# Patient Record
Sex: MALE | Race: WHITE | NOT HISPANIC OR LATINO | Employment: FULL TIME | URBAN - METROPOLITAN AREA
[De-identification: names, ages, dates, MRNs, and addresses within clinical notes are randomized per-mention and may not be internally consistent; named-entity substitution may affect disease eponyms.]

---

## 2017-06-30 PROBLEM — H40.009 BORDERLINE GLAUCOMA: Status: ACTIVE | Noted: 2017-01-05

## 2017-06-30 PROBLEM — E89.0 POSTOPERATIVE HYPOTHYROIDISM: Status: ACTIVE | Noted: 2017-01-05

## 2017-07-27 ENCOUNTER — OFFICE VISIT (OUTPATIENT)
Dept: GASTROENTEROLOGY | Facility: CLINIC | Age: 57
End: 2017-07-27

## 2017-07-27 VITALS
DIASTOLIC BLOOD PRESSURE: 86 MMHG | SYSTOLIC BLOOD PRESSURE: 138 MMHG | WEIGHT: 250.4 LBS | BODY MASS INDEX: 33.19 KG/M2 | HEIGHT: 73 IN

## 2017-07-27 DIAGNOSIS — D50.0 IRON DEFICIENCY ANEMIA DUE TO CHRONIC BLOOD LOSS: Primary | ICD-10-CM

## 2017-07-27 DIAGNOSIS — R19.5 OCCULT BLOOD IN STOOLS: ICD-10-CM

## 2017-07-27 PROCEDURE — 99203 OFFICE O/P NEW LOW 30 MIN: CPT | Performed by: INTERNAL MEDICINE

## 2017-07-27 NOTE — PROGRESS NOTES
Chief Complaint   Patient presents with   • Anemia   • Black or Bloody Stool     Royer Arceo is a 56 y.o. male who presents with a History of iron deficiency anemia with heme positive stools   HPI     Patient 56-year-old male with history of hypertension and hyperlipidemia presents for evaluation.  Patient noted with anemia underwent lab testing revealing iron deficiency.  Patient's stool testing was then done showing heme positivity.  Patient reports no bright red blood per rectum or melena.  Patient denies abdominal pain no nausea vomiting no change in appetite or weight loss.  Patient now here for further recommendations.  Patient reports last colonoscopy in 2011 was negative.  Patient with no family history colon rectal cancer or polyps.    Past Medical History:   Diagnosis Date   • Glaucoma    • Hyperlipidemia    • Hypothyroidism        Current Outpatient Prescriptions:   •  dorzolamide (TRUSOPT) 2 % ophthalmic solution, 1 drop., Disp: , Rfl:   •  levothyroxine (SYNTHROID, LEVOTHROID) 200 MCG tablet, Take 1 tablet by mouth daily., Disp: 90 tablet, Rfl: 3  •  meloxicam (MOBIC) 7.5 MG tablet, Take 1 tablet by mouth daily., Disp: 90 tablet, Rfl: 3  •  azithromycin (ZITHROMAX Z-FAMILIA) 250 MG tablet, Take 2 tablets the first day, then 1 tablet daily for 4 days., Disp: 6 tablet, Rfl: 0  •  Chlorphen-PSE-Atrop-Hyos-Scop (STAHIST PO), Take  by mouth., Disp: , Rfl:   Allergies   Allergen Reactions   • Penicillins    • Promethazine      Social History     Social History   • Marital status:      Spouse name: N/A   • Number of children: N/A   • Years of education: N/A     Occupational History   • Not on file.     Social History Main Topics   • Smoking status: Never Smoker   • Smokeless tobacco: Not on file   • Alcohol use Yes      Comment: very occasional    • Drug use: No   • Sexual activity: Not on file     Other Topics Concern   • Not on file     Social History Narrative     Family History   Problem Relation Age  of Onset   • Parkinsonism Father    • Diabetes Father    • Dementia Father    • Heart disease Maternal Aunt    • Stroke Paternal Grandfather    • Heart disease Maternal Aunt      Review of Systems   Constitutional: Negative.    HENT: Negative.    Eyes: Negative.    Respiratory: Negative.    Cardiovascular: Negative.    Gastrointestinal: Positive for blood in stool. Negative for abdominal distention, abdominal pain, anal bleeding, constipation, diarrhea, nausea, rectal pain and vomiting.   Endocrine: Negative.    Musculoskeletal: Negative.    Skin: Negative.    Allergic/Immunologic: Negative.    Hematological: Negative.      Vitals:    07/27/17 0959   BP: 138/86     Physical Exam   Constitutional: He is oriented to person, place, and time. He appears well-developed and well-nourished.   HENT:   Head: Normocephalic and atraumatic.   Eyes: Conjunctivae and EOM are normal. No scleral icterus.   Neck: Normal range of motion. No tracheal deviation present.   Cardiovascular: Normal rate and regular rhythm.  Exam reveals no gallop and no friction rub.    No murmur heard.  Pulmonary/Chest: Effort normal and breath sounds normal. No respiratory distress. He has no wheezes. He has no rales.   Abdominal: Soft. Bowel sounds are normal. He exhibits no distension and no mass. There is no tenderness. There is no rebound and no guarding.   Musculoskeletal: Normal range of motion. He exhibits no edema.   Lymphadenopathy:     He has no cervical adenopathy.   Neurological: He is alert and oriented to person, place, and time. No cranial nerve deficit.   Skin: Skin is warm and dry. No rash noted.   Psychiatric: He has a normal mood and affect. His behavior is normal.   Nursing note and vitals reviewed.    Diagnoses and all orders for this visit:    Iron deficiency anemia due to chronic blood loss  -     Case Request; Standing  -     Case Request    Occult blood in stools    Other orders  -     Implement Anesthesia orders day of  procedure.; Standing  -     Obtain informed consent; Standing    Patient is a 56-year-old male with history of hypothyroidism and hyperlipidemia presenting with heme-positive anemia.  Patient found iron deficient referred now for evaluation.  Patient did undergo colonoscopy in 2011 that was reportedly negative.  Labs reveal positive for iron deficiency.  In this setting would recommend patient have repeat colonoscopy but also EGD to evaluate for cause of heme positive iron deficiency anemia.

## 2017-07-31 ENCOUNTER — HOSPITAL ENCOUNTER (OUTPATIENT)
Facility: HOSPITAL | Age: 57
Setting detail: HOSPITAL OUTPATIENT SURGERY
Discharge: HOME | End: 2017-07-31
Attending: INTERNAL MEDICINE | Admitting: INTERNAL MEDICINE

## 2017-07-31 ENCOUNTER — TELEPHONE (OUTPATIENT)
Dept: GASTROENTEROLOGY | Facility: CLINIC | Age: 57
End: 2017-07-31

## 2017-07-31 ENCOUNTER — ANESTHESIA EVENT (OUTPATIENT)
Dept: GASTROENTEROLOGY | Facility: HOSPITAL | Age: 57
End: 2017-07-31

## 2017-07-31 ENCOUNTER — ANESTHESIA (OUTPATIENT)
Dept: GASTROENTEROLOGY | Facility: HOSPITAL | Age: 57
End: 2017-07-31

## 2017-07-31 VITALS
HEART RATE: 60 BPM | DIASTOLIC BLOOD PRESSURE: 84 MMHG | WEIGHT: 245 LBS | OXYGEN SATURATION: 98 % | SYSTOLIC BLOOD PRESSURE: 123 MMHG | HEIGHT: 73 IN | TEMPERATURE: 97.6 F | RESPIRATION RATE: 15 BRPM | BODY MASS INDEX: 32.47 KG/M2

## 2017-07-31 DIAGNOSIS — D50.0 IRON DEFICIENCY ANEMIA DUE TO CHRONIC BLOOD LOSS: ICD-10-CM

## 2017-07-31 PROCEDURE — 25010000002 PROPOFOL 10 MG/ML EMULSION: Performed by: NURSE ANESTHETIST, CERTIFIED REGISTERED

## 2017-07-31 PROCEDURE — 43239 EGD BIOPSY SINGLE/MULTIPLE: CPT | Performed by: INTERNAL MEDICINE

## 2017-07-31 PROCEDURE — 45378 DIAGNOSTIC COLONOSCOPY: CPT | Performed by: INTERNAL MEDICINE

## 2017-07-31 PROCEDURE — 88305 TISSUE EXAM BY PATHOLOGIST: CPT | Performed by: INTERNAL MEDICINE

## 2017-07-31 RX ORDER — SODIUM CHLORIDE 0.9 % (FLUSH) 0.9 %
1-10 SYRINGE (ML) INJECTION AS NEEDED
Status: DISCONTINUED | OUTPATIENT
Start: 2017-07-31 | End: 2017-07-31 | Stop reason: HOSPADM

## 2017-07-31 RX ORDER — SODIUM CHLORIDE, SODIUM LACTATE, POTASSIUM CHLORIDE, CALCIUM CHLORIDE 600; 310; 30; 20 MG/100ML; MG/100ML; MG/100ML; MG/100ML
30 INJECTION, SOLUTION INTRAVENOUS CONTINUOUS PRN
Status: DISCONTINUED | OUTPATIENT
Start: 2017-07-31 | End: 2017-07-31 | Stop reason: HOSPADM

## 2017-07-31 RX ORDER — PROPOFOL 10 MG/ML
VIAL (ML) INTRAVENOUS CONTINUOUS PRN
Status: DISCONTINUED | OUTPATIENT
Start: 2017-07-31 | End: 2017-07-31 | Stop reason: SURG

## 2017-07-31 RX ADMIN — PROPOFOL 140 MCG/KG/MIN: 10 INJECTION, EMULSION INTRAVENOUS at 13:45

## 2017-07-31 RX ADMIN — SODIUM CHLORIDE, POTASSIUM CHLORIDE, SODIUM LACTATE AND CALCIUM CHLORIDE: 600; 310; 30; 20 INJECTION, SOLUTION INTRAVENOUS at 13:42

## 2017-07-31 RX ADMIN — SODIUM CHLORIDE, POTASSIUM CHLORIDE, SODIUM LACTATE AND CALCIUM CHLORIDE 30 ML/HR: 600; 310; 30; 20 INJECTION, SOLUTION INTRAVENOUS at 13:12

## 2017-07-31 NOTE — ANESTHESIA PREPROCEDURE EVALUATION
Anesthesia Evaluation     Patient summary reviewed and Nursing notes reviewed   NPO Solid Status: > 8 hours  NPO Liquid Status: > 8 hours     Airway   Mallampati: III  TM distance: <3 FB  Dental - normal exam     Pulmonary - normal exam   Cardiovascular - normal exam        Neuro/Psych  GI/Hepatic/Renal/Endo    (+) obesity,  hypothyroidism,     Musculoskeletal     Abdominal  - normal exam   Substance History      OB/GYN          Other                                        Anesthesia Plan    ASA 2     MAC     Anesthetic plan and risks discussed with patient.

## 2017-07-31 NOTE — ANESTHESIA POSTPROCEDURE EVALUATION
"Patient: Royer Arceo    Procedure Summary     Date Anesthesia Start Anesthesia Stop Room / Location    07/31/17 1341 1417  [facility]       Procedure Diagnosis Surgeon Provider    COLONOSCOPY TO CECUM AND INTO TERMNAL ILEUM (N/A ); ESOPHAGOGASTRODUODENOSCOPY WITH COLD BIOPSIES (N/A Esophagus) Iron deficiency anemia due to chronic blood loss; Occult blood in stools; Diverticulosis; Internal hemorrhoids  (Iron deficiency anemia due to chronic blood loss [D50.0]) MD Eliana Powers MD          Anesthesia Type: MAC  Last vitals  BP   123/84 (07/31/17 1437)    Temp   36.4 °C (97.6 °F) (07/31/17 1425)    Pulse   60 (07/31/17 1437)   Resp   15 (07/31/17 1437)    SpO2   98 % (07/31/17 1437)      Post Anesthesia Care and Evaluation    Patient location during evaluation: PACU  Patient participation: complete - patient participated  Level of consciousness: awake  Pain score: 0  Pain management: adequate  Airway patency: patent  Anesthetic complications: No anesthetic complications    Cardiovascular status: acceptable  Respiratory status: acceptable  Hydration status: acceptable    Comments: Blood pressure 123/84, pulse 60, temperature 36.4 °C (97.6 °F), temperature source Oral, resp. rate 15, height 73\" (185.4 cm), weight 245 lb (111 kg), SpO2 98 %.        "

## 2017-08-01 LAB
LAB AP CASE REPORT: NORMAL
Lab: NORMAL
PATH REPORT.FINAL DX SPEC: NORMAL
PATH REPORT.GROSS SPEC: NORMAL

## 2017-08-09 ENCOUNTER — TELEPHONE (OUTPATIENT)
Dept: GASTROENTEROLOGY | Facility: CLINIC | Age: 57
End: 2017-08-09

## 2017-08-09 NOTE — TELEPHONE ENCOUNTER
Message was sent to Tawnya 7/31/17 to work on approval for capsule endoscopy. It looks like precert is pending review with insurance.    Called pt and advised that per Dr Rowe: the biopsy from his scope was negative for celiac disease and MD recommends to await the capsule study. Advised that we are working on getting the study approved through insurance and once that is obtained, we can schedule the test. Pt verb understanding.

## 2017-08-09 NOTE — TELEPHONE ENCOUNTER
----- Message from Lm Rowe MD sent at 8/4/2017 12:36 PM EDT -----  Patient biopsies negative for celiac.  Await small bowel capsule

## 2017-09-22 ENCOUNTER — TELEPHONE (OUTPATIENT)
Dept: GASTROENTEROLOGY | Facility: CLINIC | Age: 57
End: 2017-09-22

## 2017-09-22 NOTE — TELEPHONE ENCOUNTER
Dr Rowe,   Pt came in today for capsule study. Video will be available to read tomorrow.   Thanks

## 2017-10-23 ENCOUNTER — TELEPHONE (OUTPATIENT)
Dept: GASTROENTEROLOGY | Facility: CLINIC | Age: 57
End: 2017-10-23

## 2017-10-23 NOTE — TELEPHONE ENCOUNTER
Dr Rowe,   Pt stopped by for capsule study results. This was done one month ago. Pt has had EGD and C/S which were negative but his MDs are thinking he is still losing blood somehow. What are results and if negative, next step?  Thanks

## 2019-01-29 ENCOUNTER — OFFICE VISIT (OUTPATIENT)
Dept: ORTHOPEDIC SURGERY | Facility: CLINIC | Age: 59
End: 2019-01-29

## 2019-01-29 VITALS — TEMPERATURE: 98.8 F | HEIGHT: 73 IN | BODY MASS INDEX: 35.12 KG/M2 | WEIGHT: 265 LBS

## 2019-01-29 DIAGNOSIS — M43.10 RETROLISTHESIS: ICD-10-CM

## 2019-01-29 DIAGNOSIS — M48.062 SPINAL STENOSIS OF LUMBAR REGION WITH NEUROGENIC CLAUDICATION: ICD-10-CM

## 2019-01-29 PROCEDURE — 99205 OFFICE O/P NEW HI 60 MIN: CPT | Performed by: ORTHOPAEDIC SURGERY

## 2019-01-29 RX ORDER — LEVOTHYROXINE SODIUM 175 UG/1
175 TABLET ORAL
Refills: 0 | COMMUNITY
Start: 2019-01-25

## 2019-01-29 NOTE — PROGRESS NOTES
New patient or new problem visit    Chief Complaint   Patient presents with   • Lumbar Spine - Pain       HPI: He complains of chronic low back pain but a 6 month history of increased back pain and radiating pain into the right lower extremity in the buttock thigh and leg.  He has failed to improve with conservative care including physical therapy, chiropractic, medications.  An epidural injection helped somewhat and he has another one is planned.  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  He is seen today at request of Dr. Ordaz and he has also seen Dr. Mckenzie    PFSH: See chart- reviewed    Review of Systems   Constitutional: Negative for chills, fever and unexpected weight change.   HENT: Negative for trouble swallowing and voice change.    Eyes: Negative for visual disturbance.   Respiratory: Negative for cough and shortness of breath.    Cardiovascular: Negative for chest pain and leg swelling.   Gastrointestinal: Negative for abdominal pain, nausea and vomiting.   Endocrine: Negative for cold intolerance and heat intolerance.   Genitourinary: Negative for difficulty urinating, frequency and urgency.   Skin: Negative for rash and wound.   Allergic/Immunologic: Negative for immunocompromised state.   Neurological: Negative for weakness and numbness.   Hematological: Does not bruise/bleed easily.   Psychiatric/Behavioral: Negative for dysphoric mood. The patient is not nervous/anxious.        PE: Constitutional: Vital signs above-noted.  Awake, alert and oriented    Psychiatric: Affect and insight do not appear grossly disturbed.    Pulmonary: Breathing is unlabored, color is good.    Skin: Warm, dry and normal turgor    Cardiac:  pedal pulses intact.  No edema.    Eyesight and hearing appear adequate for examination purposes      Musculoskeletal:  There is mild tenderness to percussion and palpation of the spine.  Motion appears slightly stiff.  Posture is unremarkable to coronal and sagittal inspection.    The skin about the area is intact.  There is no palpable or visible deformity.  There is no local spasm.       Neurologic:   Reflexes are absent in the patellae and achilles.   Motor function is undisturbed in quadriceps, EHL, and gastrocnemius      Sensation appears symmetrically intact to light touch   .  In the bilateral lower extremities there is no evidence of atrophy.   Clonus is absent..  Gait appears undisturbed.  SLR test negative      MEDICAL DECISION MAKING    XRAY: Plain film x-rays of the lumbar spine show slight scoliosis and multilevel disc degeneration.  There is marketed disc degeneration at L1 2 and then moderate disc space narrowing at L5-S1 where a retrolisthesis is noted on flexion-extension x-rays this increases to about 6 mm and reduces to near normal on the flexion images.  It is about 3 mm on the myelogram images.  Myelogram demonstrates a filling defect on the right side at L3 4.  It looks good otherwise.  Post myelogram CT scan demonstrates a straight dural defect at L3 4 in continuity with facet joint at that level which crowds the roots into the remaining space and significantly narrows contrast flow.  At L5-S1 a large medial osteophyte from the facet joint at that level is causing direct impingement of the passing S1 root and there is a significant amount of foraminal stenosis as well which may be causing L5 radiculopathy.  MRI scan shows similar findings and confirms the lesion at L3 4 to the be a synovial cyst in all likelihood.  I reviewed the radiologist reports on all of these and the agree with them except for the MRI were no mention is made of the significant finding on the right the L5-S1 facet and foramen which are clearly stenotic.    Other: Dr. Mckenzie recommended pain management after initial recommendation for surgical decompression.    Impression: L3 4 right synovial cyst, L5-S1  retrolisthesis with spinal stenosis primarily right subarticular and foraminal.  Mostly radiating pain but a large component of axial pain as well.    Plan: He is miserable with pain and it dramatically impacting his activity inlays that I think can be highly reliably improved upon with surgery.  In this case I would recommend a right L3 4 laminectomy and excision of synovial cyst, and an L5-S1 laminectomy and fusion with instrumentation.  This would likely need his radicular pain and improve at least his back pain although it's unlikely to completely alleviate his axial pain.  I told him I would recommend against a more aggressive attempt to treat the axial pain with a longer fusion.  I discussed the risks and benefits of posterior spinal fusion, including where applicable, laminectomy.  Risks include adverse anesthetic events such as death, stroke and myocardial infarction.  More specific surgical risks include infection, nonunion, hardware failure, spinal fluid leakage, nerve root injury or paralysis, visceral or vascular injury, persistent pain, deep venous thrombosis, and pulmonary embolism among others. There is a 70 to 90 % chance of success.   Alternatives have been discussed.  After careful consideration the patient wishes to proceed with right L3 4, and L5-S1 laminectomy with L5-S1 fusion with instrumentation.    I thank Dr. Ordaz for this kind referral .

## 2019-02-04 PROBLEM — M43.10 RETROLISTHESIS: Status: ACTIVE | Noted: 2019-02-04

## 2019-02-04 PROBLEM — M48.062 SPINAL STENOSIS OF LUMBAR REGION WITH NEUROGENIC CLAUDICATION: Status: ACTIVE | Noted: 2019-02-04

## 2019-02-11 ENCOUNTER — APPOINTMENT (OUTPATIENT)
Dept: PREADMISSION TESTING | Facility: HOSPITAL | Age: 59
End: 2019-02-11

## 2019-02-11 VITALS
WEIGHT: 264.5 LBS | DIASTOLIC BLOOD PRESSURE: 73 MMHG | OXYGEN SATURATION: 96 % | SYSTOLIC BLOOD PRESSURE: 112 MMHG | HEIGHT: 73 IN | TEMPERATURE: 97.3 F | RESPIRATION RATE: 16 BRPM | BODY MASS INDEX: 35.06 KG/M2 | HEART RATE: 67 BPM

## 2019-02-11 LAB
ANION GAP SERPL CALCULATED.3IONS-SCNC: 12.5 MMOL/L
BUN BLD-MCNC: 19 MG/DL (ref 6–20)
BUN/CREAT SERPL: 19.2 (ref 7–25)
CALCIUM SPEC-SCNC: 9.6 MG/DL (ref 8.6–10.5)
CHLORIDE SERPL-SCNC: 102 MMOL/L (ref 98–107)
CO2 SERPL-SCNC: 24.5 MMOL/L (ref 22–29)
CREAT BLD-MCNC: 0.99 MG/DL (ref 0.76–1.27)
DEPRECATED RDW RBC AUTO: 43.5 FL (ref 37–54)
ERYTHROCYTE [DISTWIDTH] IN BLOOD BY AUTOMATED COUNT: 14 % (ref 12.3–15.4)
GFR SERPL CREATININE-BSD FRML MDRD: 78 ML/MIN/1.73
GLUCOSE BLD-MCNC: 113 MG/DL (ref 65–99)
HCT VFR BLD AUTO: 43.8 % (ref 37.5–51)
HGB BLD-MCNC: 14 G/DL (ref 13–17.7)
MCH RBC QN AUTO: 27 PG (ref 26.6–33)
MCHC RBC AUTO-ENTMCNC: 32 G/DL (ref 31.5–35.7)
MCV RBC AUTO: 84.6 FL (ref 79–97)
PLATELET # BLD AUTO: 239 10*3/MM3 (ref 140–450)
PMV BLD AUTO: 11.6 FL (ref 6–12)
POTASSIUM BLD-SCNC: 4.6 MMOL/L (ref 3.5–5.2)
RBC # BLD AUTO: 5.18 10*6/MM3 (ref 4.14–5.8)
SODIUM BLD-SCNC: 139 MMOL/L (ref 136–145)
WBC NRBC COR # BLD: 7.77 10*3/MM3 (ref 3.4–10.8)

## 2019-02-11 PROCEDURE — 80048 BASIC METABOLIC PNL TOTAL CA: CPT | Performed by: ORTHOPAEDIC SURGERY

## 2019-02-11 PROCEDURE — 85027 COMPLETE CBC AUTOMATED: CPT | Performed by: ORTHOPAEDIC SURGERY

## 2019-02-11 PROCEDURE — 93005 ELECTROCARDIOGRAM TRACING: CPT

## 2019-02-11 PROCEDURE — 36415 COLL VENOUS BLD VENIPUNCTURE: CPT

## 2019-02-11 PROCEDURE — 93010 ELECTROCARDIOGRAM REPORT: CPT | Performed by: INTERNAL MEDICINE

## 2019-02-11 NOTE — DISCHARGE INSTRUCTIONS
PLEASE ARRIVE AT 5:30 AM ON 2/18/2019      Take the following medications the morning of surgery with a small sip of water:        General Instructions:  • Do not eat solid food after midnight the night before surgery.  • You may drink clear liquids day of surgery but must stop at least one hour before your hospital arrival time. **STOP FLUIDS AT 4:30 AM DAY OF SURGERY**  • It is beneficial for you to have a clear drink that contains carbohydrates the day of surgery.  We suggest a 12 to 20 ounce bottle of Gatorade or Powerade for non-diabetic patients or a 12 to 20 ounce bottle of G2 or Powerade Zero for diabetic patients. (Pediatric patients, are not advised to drink a 12 to 20 ounce carbohydrate drink)    Clear liquids are liquids you can see through.  Nothing red in color.     Plain water                               Sports drinks  Sodas                                   Gelatin (Jell-O)  Fruit juices without pulp such as white grape juice and apple juice  Popsicles that contain no fruit or yogurt  Tea or coffee (no cream or milk added)  Gatorade / Powerade  G2 / Powerade Zero    • Infants may have breast milk up to four hours before surgery.  • Infants drinking formula may drink formula up to six hours before surgery.   • Patients who avoid smoking, chewing tobacco and alcohol for 4 weeks prior to surgery have a reduced risk of post-operative complications.  Quit smoking as many days before surgery as you can.  • Do not smoke, use chewing tobacco or drink alcohol the day of surgery.   • If applicable bring your C-PAP/ BI-PAP machine.  • Bring any papers given to you in the doctor’s office.  • Wear clean comfortable clothes and socks.  • Do not wear contact lenses or make-up.  Bring a case for your glasses.   • Bring crutches or walker if applicable.  • Remove all piercings.  Leave jewelry and any other valuables at home.  • Hair extensions with metal clips must be removed prior to surgery.  • The Pre-Admission  Testing nurse will instruct you to bring medications if unable to obtain an accurate list in Pre-Admission Testing.            Preventing a Surgical Site Infection:  • For 2 to 3 days before surgery, avoid shaving with a razor because the razor can irritate skin and make it easier to develop an infection.    • Any areas of open skin can increase the risk of a post-operative wound infection by allowing bacteria to enter and travel throughout the body.  Notify your surgeon if you have any skin wounds / rashes even if it is not near the expected surgical site.  The area will need assessed to determine if surgery should be delayed until it is healed.  • The night prior to surgery sleep in a clean bed with clean clothing.  Do not allow pets to sleep with you.  • Shower on the morning of surgery using a fresh bar of anti-bacterial soap (such as Dial) and clean washcloth.  Dry with a clean towel and dress in clean clothing.  • Ask your surgeon if you will be receiving antibiotics prior to surgery.  • Make sure you, your family, and all healthcare providers clean their hands with soap and water or an alcohol based hand  before caring for you or your wound.    Day of surgery:  Upon arrival, a Pre-op nurse and Anesthesiologist will review your health history, obtain vital signs, and answer questions you may have.  The only belongings needed at this time will be your home medications and if applicable your C-PAP/BI-PAP machine.  If you are staying overnight your family can leave the rest of your belongings in the car and bring them to your room later.  A Pre-op nurse will start an IV and you may receive medication in preparation for surgery, including something to help you relax.  Your family will be able to see you in the Pre-op area.  While you are in surgery your family should notify the waiting room  if they leave the waiting room area and provide a contact phone number.    Please be aware that surgery  does come with discomfort.  We want to make every effort to control your discomfort so please discuss any uncontrolled symptoms with your nurse.   Your doctor will most likely have prescribed pain medications.      If you are going home after surgery you will receive individualized written care instructions before being discharged.  A responsible adult must drive you to and from the hospital on the day of your surgery and stay with you for 24 hours.    If you are staying overnight following surgery, you will be transported to your hospital room following the recovery period.  [facility] has all private rooms.    You have received a list of surgical assistants for your reference.  If you have any questions please call Pre-Admission Testing at [phone].  Deductibles and co-payments are collected on the day of service. Please be prepared to pay the required co-pay, deductible or deposit on the day of service as defined by your plan.

## 2019-02-18 ENCOUNTER — ANESTHESIA EVENT (OUTPATIENT)
Dept: PERIOP | Facility: HOSPITAL | Age: 59
End: 2019-02-18

## 2019-02-18 ENCOUNTER — ANESTHESIA (OUTPATIENT)
Dept: PERIOP | Facility: HOSPITAL | Age: 59
End: 2019-02-18

## 2019-02-18 ENCOUNTER — HOSPITAL ENCOUNTER (INPATIENT)
Facility: HOSPITAL | Age: 59
LOS: 3 days | Discharge: HOME | End: 2019-02-21
Attending: ORTHOPAEDIC SURGERY | Admitting: ORTHOPAEDIC SURGERY

## 2019-02-18 ENCOUNTER — APPOINTMENT (OUTPATIENT)
Dept: GENERAL RADIOLOGY | Facility: HOSPITAL | Age: 59
End: 2019-02-18

## 2019-02-18 DIAGNOSIS — M48.062 SPINAL STENOSIS OF LUMBAR REGION WITH NEUROGENIC CLAUDICATION: ICD-10-CM

## 2019-02-18 DIAGNOSIS — Z74.09 IMPAIRED MOBILITY: Primary | ICD-10-CM

## 2019-02-18 DIAGNOSIS — M43.10 RETROLISTHESIS: ICD-10-CM

## 2019-02-18 LAB
ABO GROUP BLD: NORMAL
BLD GP AB SCN SERPL QL: NEGATIVE
HCT VFR BLD AUTO: 38.9 % (ref 37.5–51)
HGB BLD-MCNC: 12.8 G/DL (ref 13–17.7)
RH BLD: POSITIVE
T&S EXPIRATION DATE: NORMAL

## 2019-02-18 PROCEDURE — 25010000002 PROPOFOL 10 MG/ML EMULSION: Performed by: NURSE ANESTHETIST, CERTIFIED REGISTERED

## 2019-02-18 PROCEDURE — 25010000002 MIDAZOLAM PER 1 MG: Performed by: ANESTHESIOLOGY

## 2019-02-18 PROCEDURE — 00UT0JZ SUPPLEMENT SPINAL MENINGES WITH SYNTHETIC SUBSTITUTE, OPEN APPROACH: ICD-10-PCS | Performed by: ORTHOPAEDIC SURGERY

## 2019-02-18 PROCEDURE — 22840 INSERT SPINE FIXATION DEVICE: CPT | Performed by: ORTHOPAEDIC SURGERY

## 2019-02-18 PROCEDURE — C1713 ANCHOR/SCREW BN/BN,TIS/BN: HCPCS | Performed by: ORTHOPAEDIC SURGERY

## 2019-02-18 PROCEDURE — 25010000002 HYDROMORPHONE PER 4 MG: Performed by: NURSE ANESTHETIST, CERTIFIED REGISTERED

## 2019-02-18 PROCEDURE — 85014 HEMATOCRIT: CPT | Performed by: ORTHOPAEDIC SURGERY

## 2019-02-18 PROCEDURE — 86901 BLOOD TYPING SEROLOGIC RH(D): CPT | Performed by: ORTHOPAEDIC SURGERY

## 2019-02-18 PROCEDURE — 85018 HEMOGLOBIN: CPT | Performed by: ORTHOPAEDIC SURGERY

## 2019-02-18 PROCEDURE — 25010000002 VANCOMYCIN 1 G RECONSTITUTED SOLUTION 1 EACH VIAL: Performed by: ORTHOPAEDIC SURGERY

## 2019-02-18 PROCEDURE — 4A11X4G MONITORING OF PERIPHERAL NERVOUS ELECTRICAL ACTIVITY, INTRAOPERATIVE, EXTERNAL APPROACH: ICD-10-PCS | Performed by: ORTHOPAEDIC SURGERY

## 2019-02-18 PROCEDURE — 25010000002 DEXAMETHASONE PER 1 MG: Performed by: NURSE ANESTHETIST, CERTIFIED REGISTERED

## 2019-02-18 PROCEDURE — 63267 EXCISE INTRSPINL LESION LMBR: CPT | Performed by: ORTHOPAEDIC SURGERY

## 2019-02-18 PROCEDURE — 72100 X-RAY EXAM L-S SPINE 2/3 VWS: CPT

## 2019-02-18 PROCEDURE — 25010000002 ONDANSETRON PER 1 MG: Performed by: NURSE ANESTHETIST, CERTIFIED REGISTERED

## 2019-02-18 PROCEDURE — 76000 FLUOROSCOPY <1 HR PHYS/QHP: CPT

## 2019-02-18 PROCEDURE — 20936 SP BONE AGRFT LOCAL ADD-ON: CPT | Performed by: ORTHOPAEDIC SURGERY

## 2019-02-18 PROCEDURE — 88304 TISSUE EXAM BY PATHOLOGIST: CPT | Performed by: ORTHOPAEDIC SURGERY

## 2019-02-18 PROCEDURE — 0SG3071 FUSION OF LUMBOSACRAL JOINT WITH AUTOLOGOUS TISSUE SUBSTITUTE, POSTERIOR APPROACH, POSTERIOR COLUMN, OPEN APPROACH: ICD-10-PCS | Performed by: ORTHOPAEDIC SURGERY

## 2019-02-18 PROCEDURE — 25010000002 HEPARIN (PORCINE) PER 1000 UNITS: Performed by: ORTHOPAEDIC SURGERY

## 2019-02-18 PROCEDURE — 86850 RBC ANTIBODY SCREEN: CPT | Performed by: ORTHOPAEDIC SURGERY

## 2019-02-18 PROCEDURE — 25010000002 PHENYLEPHRINE PER 1 ML: Performed by: NURSE ANESTHETIST, CERTIFIED REGISTERED

## 2019-02-18 PROCEDURE — 86900 BLOOD TYPING SEROLOGIC ABO: CPT | Performed by: ORTHOPAEDIC SURGERY

## 2019-02-18 PROCEDURE — 63047 LAM FACETEC & FORAMOT LUMBAR: CPT | Performed by: ORTHOPAEDIC SURGERY

## 2019-02-18 PROCEDURE — 25010000002 FENTANYL CITRATE (PF) 100 MCG/2ML SOLUTION: Performed by: NURSE ANESTHETIST, CERTIFIED REGISTERED

## 2019-02-18 PROCEDURE — 01NB0ZZ RELEASE LUMBAR NERVE, OPEN APPROACH: ICD-10-PCS | Performed by: ORTHOPAEDIC SURGERY

## 2019-02-18 PROCEDURE — 25010000002 VANCOMYCIN 10 G RECONSTITUTED SOLUTION: Performed by: ORTHOPAEDIC SURGERY

## 2019-02-18 PROCEDURE — 22612 ARTHRD PST TQ 1NTRSPC LUMBAR: CPT | Performed by: ORTHOPAEDIC SURGERY

## 2019-02-18 DEVICE — SCRW EXPEDIUM PA TI 6X40MM: Type: IMPLANTABLE DEVICE | Site: SPINE LUMBAR | Status: FUNCTIONAL

## 2019-02-18 DEVICE — SCRW EXPEDIUM PA TI 6X45MM: Type: IMPLANTABLE DEVICE | Site: SPINE LUMBAR | Status: FUNCTIONAL

## 2019-02-18 DEVICE — SCRW INNR EXPEDIUM: Type: IMPLANTABLE DEVICE | Site: SPINE LUMBAR | Status: FUNCTIONAL

## 2019-02-18 DEVICE — ROD PREBNT SPINE EXPEDIUM TI 5.5X40MM: Type: IMPLANTABLE DEVICE | Site: SPINE LUMBAR | Status: FUNCTIONAL

## 2019-02-18 DEVICE — SEALANT FIBRIN TISSEEL FZ 4ML: Type: IMPLANTABLE DEVICE | Site: SPINE LUMBAR | Status: FUNCTIONAL

## 2019-02-18 RX ORDER — SODIUM CHLORIDE 450 MG/100ML
100 INJECTION, SOLUTION INTRAVENOUS CONTINUOUS
Status: DISCONTINUED | OUTPATIENT
Start: 2019-02-18 | End: 2019-02-20

## 2019-02-18 RX ORDER — OXYCODONE AND ACETAMINOPHEN 7.5; 325 MG/1; MG/1
1 TABLET ORAL ONCE AS NEEDED
Status: DISCONTINUED | OUTPATIENT
Start: 2019-02-18 | End: 2019-02-18 | Stop reason: HOSPADM

## 2019-02-18 RX ORDER — PROPOFOL 10 MG/ML
VIAL (ML) INTRAVENOUS AS NEEDED
Status: DISCONTINUED | OUTPATIENT
Start: 2019-02-18 | End: 2019-02-18 | Stop reason: SURG

## 2019-02-18 RX ORDER — EPHEDRINE SULFATE 50 MG/ML
5 INJECTION, SOLUTION INTRAVENOUS ONCE AS NEEDED
Status: DISCONTINUED | OUTPATIENT
Start: 2019-02-18 | End: 2019-02-18 | Stop reason: HOSPADM

## 2019-02-18 RX ORDER — ONDANSETRON 4 MG/1
4 TABLET, ORALLY DISINTEGRATING ORAL EVERY 6 HOURS PRN
Status: DISCONTINUED | OUTPATIENT
Start: 2019-02-18 | End: 2019-02-21 | Stop reason: HOSPADM

## 2019-02-18 RX ORDER — HYDROMORPHONE HYDROCHLORIDE 1 MG/ML
0.5 INJECTION, SOLUTION INTRAMUSCULAR; INTRAVENOUS; SUBCUTANEOUS
Status: DISCONTINUED | OUTPATIENT
Start: 2019-02-18 | End: 2019-02-18 | Stop reason: HOSPADM

## 2019-02-18 RX ORDER — ONDANSETRON 4 MG/1
4 TABLET, FILM COATED ORAL EVERY 6 HOURS PRN
Status: DISCONTINUED | OUTPATIENT
Start: 2019-02-18 | End: 2019-02-21 | Stop reason: HOSPADM

## 2019-02-18 RX ORDER — FENTANYL CITRATE 50 UG/ML
INJECTION, SOLUTION INTRAMUSCULAR; INTRAVENOUS AS NEEDED
Status: DISCONTINUED | OUTPATIENT
Start: 2019-02-18 | End: 2019-02-18 | Stop reason: SURG

## 2019-02-18 RX ORDER — MIDAZOLAM HYDROCHLORIDE 1 MG/ML
2 INJECTION INTRAMUSCULAR; INTRAVENOUS
Status: DISCONTINUED | OUTPATIENT
Start: 2019-02-18 | End: 2019-02-18 | Stop reason: HOSPADM

## 2019-02-18 RX ORDER — FENTANYL CITRATE 50 UG/ML
50 INJECTION, SOLUTION INTRAMUSCULAR; INTRAVENOUS
Status: DISCONTINUED | OUTPATIENT
Start: 2019-02-18 | End: 2019-02-18 | Stop reason: HOSPADM

## 2019-02-18 RX ORDER — METAXALONE 800 MG/1
800 TABLET ORAL 3 TIMES DAILY
Status: DISCONTINUED | OUTPATIENT
Start: 2019-02-18 | End: 2019-02-21 | Stop reason: HOSPADM

## 2019-02-18 RX ORDER — SODIUM CHLORIDE 0.9 % (FLUSH) 0.9 %
1-10 SYRINGE (ML) INJECTION AS NEEDED
Status: DISCONTINUED | OUTPATIENT
Start: 2019-02-18 | End: 2019-02-18 | Stop reason: HOSPADM

## 2019-02-18 RX ORDER — HYDROMORPHONE HCL 110MG/55ML
PATIENT CONTROLLED ANALGESIA SYRINGE INTRAVENOUS AS NEEDED
Status: DISCONTINUED | OUTPATIENT
Start: 2019-02-18 | End: 2019-02-18 | Stop reason: SURG

## 2019-02-18 RX ORDER — NALOXONE HCL 0.4 MG/ML
0.1 VIAL (ML) INJECTION
Status: DISCONTINUED | OUTPATIENT
Start: 2019-02-18 | End: 2019-02-21 | Stop reason: HOSPADM

## 2019-02-18 RX ORDER — BISACODYL 10 MG
10 SUPPOSITORY, RECTAL RECTAL DAILY PRN
Status: DISCONTINUED | OUTPATIENT
Start: 2019-02-18 | End: 2019-02-21 | Stop reason: HOSPADM

## 2019-02-18 RX ORDER — DEXAMETHASONE SODIUM PHOSPHATE 10 MG/ML
INJECTION INTRAMUSCULAR; INTRAVENOUS AS NEEDED
Status: DISCONTINUED | OUTPATIENT
Start: 2019-02-18 | End: 2019-02-18 | Stop reason: SURG

## 2019-02-18 RX ORDER — HYDROMORPHONE HCL IN 0.9% NACL 10 MG/50ML
PATIENT CONTROLLED ANALGESIA SYRINGE INTRAVENOUS CONTINUOUS
Status: DISCONTINUED | OUTPATIENT
Start: 2019-02-18 | End: 2019-02-20

## 2019-02-18 RX ORDER — TEMAZEPAM 15 MG/1
15 CAPSULE ORAL NIGHTLY PRN
Status: DISCONTINUED | OUTPATIENT
Start: 2019-02-18 | End: 2019-02-21 | Stop reason: HOSPADM

## 2019-02-18 RX ORDER — EPHEDRINE SULFATE 50 MG/ML
INJECTION, SOLUTION INTRAVENOUS AS NEEDED
Status: DISCONTINUED | OUTPATIENT
Start: 2019-02-18 | End: 2019-02-18 | Stop reason: SURG

## 2019-02-18 RX ORDER — LABETALOL HYDROCHLORIDE 5 MG/ML
5 INJECTION, SOLUTION INTRAVENOUS
Status: DISCONTINUED | OUTPATIENT
Start: 2019-02-18 | End: 2019-02-18 | Stop reason: HOSPADM

## 2019-02-18 RX ORDER — HYDRALAZINE HYDROCHLORIDE 20 MG/ML
5 INJECTION INTRAMUSCULAR; INTRAVENOUS
Status: DISCONTINUED | OUTPATIENT
Start: 2019-02-18 | End: 2019-02-18 | Stop reason: HOSPADM

## 2019-02-18 RX ORDER — MIDAZOLAM HYDROCHLORIDE 1 MG/ML
1 INJECTION INTRAMUSCULAR; INTRAVENOUS
Status: DISCONTINUED | OUTPATIENT
Start: 2019-02-18 | End: 2019-02-18 | Stop reason: HOSPADM

## 2019-02-18 RX ORDER — ACETAMINOPHEN 325 MG/1
650 TABLET ORAL ONCE AS NEEDED
Status: DISCONTINUED | OUTPATIENT
Start: 2019-02-18 | End: 2019-02-18 | Stop reason: HOSPADM

## 2019-02-18 RX ORDER — SODIUM CHLORIDE 0.9 % (FLUSH) 0.9 %
1-10 SYRINGE (ML) INJECTION AS NEEDED
Status: DISCONTINUED | OUTPATIENT
Start: 2019-02-18 | End: 2019-02-21 | Stop reason: HOSPADM

## 2019-02-18 RX ORDER — OXYCODONE HYDROCHLORIDE AND ACETAMINOPHEN 5; 325 MG/1; MG/1
2 TABLET ORAL EVERY 4 HOURS PRN
Status: DISCONTINUED | OUTPATIENT
Start: 2019-02-18 | End: 2019-02-20

## 2019-02-18 RX ORDER — GLYCOPYRROLATE 0.2 MG/ML
INJECTION INTRAMUSCULAR; INTRAVENOUS AS NEEDED
Status: DISCONTINUED | OUTPATIENT
Start: 2019-02-18 | End: 2019-02-18 | Stop reason: SURG

## 2019-02-18 RX ORDER — LEVOTHYROXINE SODIUM 175 UG/1
175 TABLET ORAL
Status: DISCONTINUED | OUTPATIENT
Start: 2019-02-19 | End: 2019-02-21 | Stop reason: HOSPADM

## 2019-02-18 RX ORDER — ONDANSETRON 2 MG/ML
4 INJECTION INTRAMUSCULAR; INTRAVENOUS EVERY 6 HOURS PRN
Status: DISCONTINUED | OUTPATIENT
Start: 2019-02-18 | End: 2019-02-21 | Stop reason: HOSPADM

## 2019-02-18 RX ORDER — HYDROCODONE BITARTRATE AND ACETAMINOPHEN 7.5; 325 MG/1; MG/1
1 TABLET ORAL ONCE AS NEEDED
Status: DISCONTINUED | OUTPATIENT
Start: 2019-02-18 | End: 2019-02-18 | Stop reason: HOSPADM

## 2019-02-18 RX ORDER — DORZOLAMIDE HCL 20 MG/ML
1 SOLUTION/ DROPS OPHTHALMIC NIGHTLY
Status: DISCONTINUED | OUTPATIENT
Start: 2019-02-18 | End: 2019-02-21 | Stop reason: HOSPADM

## 2019-02-18 RX ORDER — MAGNESIUM HYDROXIDE 1200 MG/15ML
LIQUID ORAL AS NEEDED
Status: DISCONTINUED | OUTPATIENT
Start: 2019-02-18 | End: 2019-02-18 | Stop reason: HOSPADM

## 2019-02-18 RX ORDER — DIPHENHYDRAMINE HCL 25 MG
25 CAPSULE ORAL
Status: DISCONTINUED | OUTPATIENT
Start: 2019-02-18 | End: 2019-02-18 | Stop reason: HOSPADM

## 2019-02-18 RX ORDER — LISINOPRIL 20 MG/1
20 TABLET ORAL NIGHTLY
Status: DISCONTINUED | OUTPATIENT
Start: 2019-02-18 | End: 2019-02-21 | Stop reason: HOSPADM

## 2019-02-18 RX ORDER — FLUMAZENIL 0.1 MG/ML
0.2 INJECTION INTRAVENOUS AS NEEDED
Status: DISCONTINUED | OUTPATIENT
Start: 2019-02-18 | End: 2019-02-18 | Stop reason: HOSPADM

## 2019-02-18 RX ORDER — SCOLOPAMINE TRANSDERMAL SYSTEM 1 MG/1
1 PATCH, EXTENDED RELEASE TRANSDERMAL ONCE
Status: DISCONTINUED | OUTPATIENT
Start: 2019-02-18 | End: 2019-02-18

## 2019-02-18 RX ORDER — ACETAMINOPHEN 500 MG
1000 TABLET ORAL ONCE
Status: COMPLETED | OUTPATIENT
Start: 2019-02-18 | End: 2019-02-18

## 2019-02-18 RX ORDER — DIPHENHYDRAMINE HYDROCHLORIDE 50 MG/ML
12.5 INJECTION INTRAMUSCULAR; INTRAVENOUS
Status: DISCONTINUED | OUTPATIENT
Start: 2019-02-18 | End: 2019-02-18 | Stop reason: HOSPADM

## 2019-02-18 RX ORDER — ONDANSETRON 2 MG/ML
INJECTION INTRAMUSCULAR; INTRAVENOUS AS NEEDED
Status: DISCONTINUED | OUTPATIENT
Start: 2019-02-18 | End: 2019-02-18 | Stop reason: SURG

## 2019-02-18 RX ORDER — LIDOCAINE HYDROCHLORIDE 10 MG/ML
0.5 INJECTION, SOLUTION EPIDURAL; INFILTRATION; INTRACAUDAL; PERINEURAL ONCE AS NEEDED
Status: DISCONTINUED | OUTPATIENT
Start: 2019-02-18 | End: 2019-02-18 | Stop reason: HOSPADM

## 2019-02-18 RX ORDER — NALOXONE HCL 0.4 MG/ML
0.2 VIAL (ML) INJECTION AS NEEDED
Status: DISCONTINUED | OUTPATIENT
Start: 2019-02-18 | End: 2019-02-18 | Stop reason: HOSPADM

## 2019-02-18 RX ORDER — SENNA AND DOCUSATE SODIUM 50; 8.6 MG/1; MG/1
1 TABLET, FILM COATED ORAL 2 TIMES DAILY
Status: DISCONTINUED | OUTPATIENT
Start: 2019-02-18 | End: 2019-02-21 | Stop reason: HOSPADM

## 2019-02-18 RX ORDER — LIDOCAINE HYDROCHLORIDE 20 MG/ML
INJECTION, SOLUTION INFILTRATION; PERINEURAL AS NEEDED
Status: DISCONTINUED | OUTPATIENT
Start: 2019-02-18 | End: 2019-02-18 | Stop reason: SURG

## 2019-02-18 RX ORDER — ONDANSETRON 2 MG/ML
4 INJECTION INTRAMUSCULAR; INTRAVENOUS ONCE AS NEEDED
Status: DISCONTINUED | OUTPATIENT
Start: 2019-02-18 | End: 2019-02-18 | Stop reason: HOSPADM

## 2019-02-18 RX ORDER — FAMOTIDINE 10 MG/ML
20 INJECTION, SOLUTION INTRAVENOUS ONCE
Status: COMPLETED | OUTPATIENT
Start: 2019-02-18 | End: 2019-02-18

## 2019-02-18 RX ORDER — SODIUM CHLORIDE, SODIUM LACTATE, POTASSIUM CHLORIDE, CALCIUM CHLORIDE 600; 310; 30; 20 MG/100ML; MG/100ML; MG/100ML; MG/100ML
9 INJECTION, SOLUTION INTRAVENOUS CONTINUOUS
Status: DISCONTINUED | OUTPATIENT
Start: 2019-02-18 | End: 2019-02-20

## 2019-02-18 RX ORDER — SODIUM CHLORIDE 0.9 % (FLUSH) 0.9 %
3 SYRINGE (ML) INJECTION EVERY 12 HOURS SCHEDULED
Status: DISCONTINUED | OUTPATIENT
Start: 2019-02-18 | End: 2019-02-21 | Stop reason: HOSPADM

## 2019-02-18 RX ORDER — ROCURONIUM BROMIDE 10 MG/ML
INJECTION, SOLUTION INTRAVENOUS AS NEEDED
Status: DISCONTINUED | OUTPATIENT
Start: 2019-02-18 | End: 2019-02-18 | Stop reason: SURG

## 2019-02-18 RX ORDER — METHADONE HYDROCHLORIDE 10 MG/1
10 TABLET ORAL ONCE
Status: COMPLETED | OUTPATIENT
Start: 2019-02-18 | End: 2019-02-18

## 2019-02-18 RX ORDER — GABAPENTIN 300 MG/1
600 CAPSULE ORAL ONCE
Status: COMPLETED | OUTPATIENT
Start: 2019-02-18 | End: 2019-02-18

## 2019-02-18 RX ORDER — MEPERIDINE HYDROCHLORIDE 25 MG/ML
12.5 INJECTION INTRAMUSCULAR; INTRAVENOUS; SUBCUTANEOUS
Status: DISCONTINUED | OUTPATIENT
Start: 2019-02-18 | End: 2019-02-18 | Stop reason: HOSPADM

## 2019-02-18 RX ADMIN — PHENYLEPHRINE HYDROCHLORIDE 100 MCG: 10 INJECTION INTRAVENOUS at 09:11

## 2019-02-18 RX ADMIN — SODIUM CHLORIDE, POTASSIUM CHLORIDE, SODIUM LACTATE AND CALCIUM CHLORIDE: 600; 310; 30; 20 INJECTION, SOLUTION INTRAVENOUS at 07:37

## 2019-02-18 RX ADMIN — SODIUM CHLORIDE, POTASSIUM CHLORIDE, SODIUM LACTATE AND CALCIUM CHLORIDE 9 ML/HR: 600; 310; 30; 20 INJECTION, SOLUTION INTRAVENOUS at 06:56

## 2019-02-18 RX ADMIN — PROPOFOL 200 MG: 10 INJECTION, EMULSION INTRAVENOUS at 07:42

## 2019-02-18 RX ADMIN — DEXAMETHASONE SODIUM PHOSPHATE 8 MG: 10 INJECTION INTRAMUSCULAR; INTRAVENOUS at 08:08

## 2019-02-18 RX ADMIN — FAMOTIDINE 20 MG: 10 INJECTION, SOLUTION INTRAVENOUS at 06:59

## 2019-02-18 RX ADMIN — PHENYLEPHRINE HYDROCHLORIDE 100 MCG: 10 INJECTION INTRAVENOUS at 08:43

## 2019-02-18 RX ADMIN — FENTANYL CITRATE 50 MCG: 50 INJECTION, SOLUTION INTRAMUSCULAR; INTRAVENOUS at 10:47

## 2019-02-18 RX ADMIN — DORZOLAMIDE HYDROCHLORIDE 1 DROP: 20 SOLUTION/ DROPS OPHTHALMIC at 21:10

## 2019-02-18 RX ADMIN — LISINOPRIL 20 MG: 20 TABLET ORAL at 21:10

## 2019-02-18 RX ADMIN — FENTANYL CITRATE 100 MCG: 50 INJECTION, SOLUTION INTRAMUSCULAR; INTRAVENOUS at 08:16

## 2019-02-18 RX ADMIN — LIDOCAINE HYDROCHLORIDE 100 MG: 20 INJECTION, SOLUTION INFILTRATION; PERINEURAL at 07:42

## 2019-02-18 RX ADMIN — HYDROMORPHONE HYDROCHLORIDE 1 MG: 2 INJECTION INTRAMUSCULAR; INTRAVENOUS; SUBCUTANEOUS at 10:23

## 2019-02-18 RX ADMIN — METAXALONE 800 MG: 800 TABLET ORAL at 17:14

## 2019-02-18 RX ADMIN — FENTANYL CITRATE 50 MCG: 50 INJECTION, SOLUTION INTRAMUSCULAR; INTRAVENOUS at 11:25

## 2019-02-18 RX ADMIN — ROCURONIUM BROMIDE 30 MG: 10 INJECTION INTRAVENOUS at 08:41

## 2019-02-18 RX ADMIN — VANCOMYCIN HYDROCHLORIDE 1750 MG: 10 INJECTION, POWDER, LYOPHILIZED, FOR SOLUTION INTRAVENOUS at 17:15

## 2019-02-18 RX ADMIN — PROPOFOL 100 MG: 10 INJECTION, EMULSION INTRAVENOUS at 10:23

## 2019-02-18 RX ADMIN — EPHEDRINE SULFATE 10 MG: 50 INJECTION INTRAMUSCULAR; INTRAVENOUS; SUBCUTANEOUS at 08:41

## 2019-02-18 RX ADMIN — GLYCOPYRROLATE 0.3 MG: 0.2 INJECTION INTRAMUSCULAR; INTRAVENOUS at 08:26

## 2019-02-18 RX ADMIN — HYDROMORPHONE HYDROCHLORIDE 0.5 MG: 1 INJECTION, SOLUTION INTRAMUSCULAR; INTRAVENOUS; SUBCUTANEOUS at 11:25

## 2019-02-18 RX ADMIN — SODIUM CHLORIDE, POTASSIUM CHLORIDE, SODIUM LACTATE AND CALCIUM CHLORIDE: 600; 310; 30; 20 INJECTION, SOLUTION INTRAVENOUS at 08:17

## 2019-02-18 RX ADMIN — MIDAZOLAM 2 MG: 1 INJECTION INTRAMUSCULAR; INTRAVENOUS at 06:59

## 2019-02-18 RX ADMIN — SENNOSIDES AND DOCUSATE SODIUM 1 TABLET: 8.6; 5 TABLET ORAL at 21:10

## 2019-02-18 RX ADMIN — OXYCODONE AND ACETAMINOPHEN 2 TABLET: 5; 325 TABLET ORAL at 21:10

## 2019-02-18 RX ADMIN — ONDANSETRON 4 MG: 2 INJECTION INTRAMUSCULAR; INTRAVENOUS at 10:18

## 2019-02-18 RX ADMIN — HYDROMORPHONE HYDROCHLORIDE: 10 INJECTION INTRAMUSCULAR; INTRAVENOUS; SUBCUTANEOUS at 11:13

## 2019-02-18 RX ADMIN — ROCURONIUM BROMIDE 20 MG: 10 INJECTION INTRAVENOUS at 08:16

## 2019-02-18 RX ADMIN — FENTANYL CITRATE 50 MCG: 50 INJECTION, SOLUTION INTRAMUSCULAR; INTRAVENOUS at 13:37

## 2019-02-18 RX ADMIN — GABAPENTIN 600 MG: 300 CAPSULE ORAL at 06:56

## 2019-02-18 RX ADMIN — ROCURONIUM BROMIDE 40 MG: 10 INJECTION INTRAVENOUS at 07:42

## 2019-02-18 RX ADMIN — METHADONE HYDROCHLORIDE 10 MG: 10 TABLET ORAL at 06:57

## 2019-02-18 RX ADMIN — ACETAMINOPHEN 1000 MG: 500 TABLET, FILM COATED ORAL at 06:57

## 2019-02-18 RX ADMIN — PHENYLEPHRINE HYDROCHLORIDE 200 MCG: 10 INJECTION INTRAVENOUS at 09:31

## 2019-02-18 RX ADMIN — ROCURONIUM BROMIDE 20 MG: 10 INJECTION INTRAVENOUS at 10:01

## 2019-02-18 RX ADMIN — SODIUM CHLORIDE 100 ML/HR: 4.5 INJECTION, SOLUTION INTRAVENOUS at 17:14

## 2019-02-18 RX ADMIN — SUGAMMADEX 400 MG: 100 INJECTION, SOLUTION INTRAVENOUS at 10:20

## 2019-02-18 RX ADMIN — EPHEDRINE SULFATE 10 MG: 50 INJECTION INTRAMUSCULAR; INTRAVENOUS; SUBCUTANEOUS at 09:34

## 2019-02-18 RX ADMIN — FENTANYL CITRATE 100 MCG: 50 INJECTION, SOLUTION INTRAMUSCULAR; INTRAVENOUS at 07:38

## 2019-02-18 RX ADMIN — POLYETHYLENE GLYCOL 3350 17 G: 17 POWDER, FOR SOLUTION ORAL at 17:14

## 2019-02-18 RX ADMIN — SCOPALAMINE 1 PATCH: 1 PATCH, EXTENDED RELEASE TRANSDERMAL at 06:57

## 2019-02-18 RX ADMIN — VANCOMYCIN HYDROCHLORIDE 1750 MG: 10 INJECTION, POWDER, LYOPHILIZED, FOR SOLUTION INTRAVENOUS at 06:17

## 2019-02-18 NOTE — ANESTHESIA PREPROCEDURE EVALUATION
Anesthesia Evaluation     Patient summary reviewed   history of anesthetic complications: PONV  NPO Solid Status: > 8 hours  NPO Liquid Status: > 2 hours           Airway   Mallampati: II  TM distance: >3 FB  Neck ROM: full  Possible difficult intubation  Dental - normal exam     Pulmonary     breath sounds clear to auscultation  (+) asthma,   (-) COPD, sleep apnea, rhonchi, decreased breath sounds, wheezes, not a smoker  Cardiovascular   Exercise tolerance: good (4-7 METS)    Rhythm: regular  Rate: normal    (+) hypertension, hyperlipidemia,   (-) valvular problems/murmurs, past MI, dysrhythmias, angina, murmur, CABG      Neuro/Psych  (-) seizures, CVA  GI/Hepatic/Renal/Endo    (+) obesity,   hypothyroidism,   (-) liver disease, no renal disease, diabetes, hyperthyroidism    Musculoskeletal     (+) neck pain,       ROS comment: Bone spurs in neck  Abdominal     Abdomen: soft.   Substance History      OB/GYN          Other   (+) arthritis                   Anesthesia Plan    ASA 3     general   (Scopalamine patch for PONV prevention   )  intravenous induction   Anesthetic plan, all risks, benefits, and alternatives have been provided, discussed and informed consent has been obtained with: patient and spouse/significant other.

## 2019-02-18 NOTE — INTERVAL H&P NOTE
H&P updated. The patient was examined and He has a bit of ankle pain today on the right where there is minimal tenderness.  He states he has a history of arthritis but this actually could be radicular.  Additionally the insurance failed to approve payment for bone marrow aspiration and bone graft substitute placement calling it experimental.  This is untrue but I told the patient that were this myself I would not pay out-of-pocket for these amenities which are helpful but not necessary to the success of the fusion.  His likelihood for success remains high despite omitting these.  He agrees and wants to proceed

## 2019-02-18 NOTE — OP NOTE
Operative note    Pre-op diagnosis: Right L3-4 synovial cyst, L5-S1 retrolisthesis with instability and spinal stenosis    Postoperative diagnosis: Same    Procedure: Right L3-4 laminectomy medial facetectomy foraminotomy and excision of synovial cyst and L5-S1 laminectomy medial facetectomy foraminotomy and bilateral posterolateral fusion with [employer] expedient instrumentation with local bone graft    Surgeon: Jase Stark Jr, M.D.    Asst.: Zuleika Mcnulty    EBL: 200 cc    Anesthetic: Gen.    Condition: Satisfactory      Description of procedure: Patient was anesthetized and positioned prone.  Bony prominences were well padded.  The back was prepped and draped in sterile fashion.  Incision was made down to lumbodorsal fascia.  The muscle was stripped subperiosteally to the tips of transverse processes.  Curettes and rongeurs removed adherent soft tissue.  Packs were placed for hemostasis.  The graft was decorticated.  A Steffee probe was inserted at the intersection of the anatomic landmarks.  A flexible probe was inserted to check the integrity of the pedicle.  Screws were placed at L5 and S1 bilaterally.  Contoured rods were later added, nuts were tightened and torqued.  Biplanar image intensification showed appropriate screw position and anatomic level and satisfactory posture.    I performed a laminectomy for decompression.   The interspinous ligament was excised.  The upper lamina was removed along its caudal aspect out to within 8-10 mm of the pars intra-articularis.  A small portion of the cephalad aspect of the caudal lamina was excised with a Kerrison rongeur.  A right medial facetectomy removed a large medial spur which was impinging the passing S1 root and probably the exiting L5 above as well..  A high-speed mahin was used as well.  Foraminotomies were accomplished with a foraminotomy rongeur.   The disc was determined to be not impinging and stable and was not excised.  I then counted up to L3-4 where  a separate incision was made on the right.  Subperiosteal dissection out to the medial aspect of L3-4 joint was performed.  A laminectomy was then made on the right side and the medial third of the L3-4 facet was removed along with a pea-sized synovial cyst.  I could then easily retract the shoulder of the 4th nerve root medially and passive ball probe along the course of the third root above without difficulty.  A tiny dural, but not arachnoid tear slightly anterior to the shoulder of the 4th nerve root was noted and later treated with Tisseel dural sealant agent..  Upon completion of the decompression I could pass a ball probe through the affected foramina, and easily retract  the nerve roots  toward the midline.  Bleeding was controlled with bipolar cautery,and Gelfoam with thrombin and/ or FloSeal hemostatic matrix.     bone from decortication of the graft bed was cleaned at the back table throughout the case and available for bone graft.  The morcellized bone was placed in the decorticated lateral gutter bilaterally.  .  Hemostasis was assured.  The wound was then closed with running and interrupted #1 Vicryl for the dorsal fascia, 2-0 Vicryl subcutaneously, then 4-0 Monocryl and Dermabond for the skin.  A sterile dressing was applied.  The patient is about to be recovered.

## 2019-02-18 NOTE — ANESTHESIA POSTPROCEDURE EVALUATION
"Patient: Royer Arceo    Procedure Summary     Date:  02/18/19 Room / Location:  [facility] OR [address] [place] [address] MAIN OR    Anesthesia Start:  0736 Anesthesia Stop:  1057    Procedure:  Right L3-4, L5-S1 laminectomy and L5-S1 fusion with instrumentation (N/A Spine Lumbar) Diagnosis:       Retrolisthesis      Spinal stenosis of lumbar region with neurogenic claudication      (Retrolisthesis [M43.10])      (Spinal stenosis of lumbar region with neurogenic claudication [M48.062])    Surgeon:  Jase Stark MD Provider:  Sha Waters MD    Anesthesia Type:  general ASA Status:  3          Anesthesia Type: general  Last vitals  BP   133/83 (02/18/19 1140)   Temp   37 °C (98.6 °F) (02/18/19 1051)   Pulse   83 (02/18/19 1140)   Resp   16 (02/18/19 1140)     SpO2   97 % (02/18/19 1140)     Post Anesthesia Care and Evaluation    Patient location during evaluation: bedside  Patient participation: complete - patient participated  Level of consciousness: awake and alert  Pain management: adequate  Airway patency: patent  Anesthetic complications: No anesthetic complications  PONV Status: none  Cardiovascular status: acceptable  Respiratory status: acceptable  Hydration status: acceptable    Comments: /83 (BP Location: Left arm, Patient Position: Lying)   Pulse 83   Temp 37 °C (98.6 °F) (Tympanic)   Resp 16   Ht 185.4 cm (72.99\")   Wt 120 kg (264 lb)   SpO2 97%   BMI 34.84 kg/m²         "

## 2019-02-18 NOTE — ANESTHESIA PROCEDURE NOTES
Airway  Urgency: elective    Date/Time: 2/18/2019 7:47 AM  Airway not difficult    General Information and Staff    Patient location during procedure: OR  Anesthesiologist: Sha Waters MD  CRNA: Maricarmen Pemberton CRNA    Indications and Patient Condition  Indications for airway management: airway protection    Preoxygenated: yes  MILS not maintained throughout  Mask difficulty assessment: 1 - vent by mask    Final Airway Details  Final airway type: endotracheal airway      Successful airway: ETT and reinforced tube  Cuffed: yes   Successful intubation technique: direct laryngoscopy  Facilitating devices/methods: Bougie, intubating stylet and anterior pressure/BURP  Endotracheal tube insertion site: oral  Blade: Gloria  Blade size: 3  ETT size (mm): 8.0  Cormack-Lehane Classification: grade III - view of epiglottis only  Placement verified by: chest auscultation and capnometry   Cuff volume (mL): 8  Measured from: lips  ETT to lips (cm): 22  Number of attempts at approach: 2    Additional Comments  Pt preoxygenated prior to induction, easy mask airway, atraumatic intubation,+ ETCO2, + bs bilat,  ETT secured and connected to ventilator.

## 2019-02-19 LAB
CYTO UR: NORMAL
HCT VFR BLD AUTO: 35 % (ref 37.5–51)
HGB BLD-MCNC: 11.1 G/DL (ref 13–17.7)
LAB AP CASE REPORT: NORMAL
PATH REPORT.FINAL DX SPEC: NORMAL
PATH REPORT.GROSS SPEC: NORMAL

## 2019-02-19 PROCEDURE — 85018 HEMOGLOBIN: CPT | Performed by: ORTHOPAEDIC SURGERY

## 2019-02-19 PROCEDURE — 85014 HEMATOCRIT: CPT | Performed by: ORTHOPAEDIC SURGERY

## 2019-02-19 PROCEDURE — 99024 POSTOP FOLLOW-UP VISIT: CPT | Performed by: ORTHOPAEDIC SURGERY

## 2019-02-19 PROCEDURE — 94799 UNLISTED PULMONARY SVC/PX: CPT

## 2019-02-19 PROCEDURE — 97110 THERAPEUTIC EXERCISES: CPT

## 2019-02-19 PROCEDURE — 97161 PT EVAL LOW COMPLEX 20 MIN: CPT

## 2019-02-19 RX ORDER — SENNA AND DOCUSATE SODIUM 50; 8.6 MG/1; MG/1
1 TABLET, FILM COATED ORAL 2 TIMES DAILY
Qty: 60 TABLET | Refills: 0 | Status: SHIPPED | OUTPATIENT
Start: 2019-02-19 | End: 2020-10-05

## 2019-02-19 RX ADMIN — LEVOTHYROXINE SODIUM 175 MCG: 175 TABLET ORAL at 05:40

## 2019-02-19 RX ADMIN — SODIUM CHLORIDE, PRESERVATIVE FREE 3 ML: 5 INJECTION INTRAVENOUS at 21:27

## 2019-02-19 RX ADMIN — POLYETHYLENE GLYCOL 3350 17 G: 17 POWDER, FOR SOLUTION ORAL at 10:56

## 2019-02-19 RX ADMIN — SENNOSIDES AND DOCUSATE SODIUM 1 TABLET: 8.6; 5 TABLET ORAL at 10:56

## 2019-02-19 RX ADMIN — SODIUM CHLORIDE 100 ML/HR: 4.5 INJECTION, SOLUTION INTRAVENOUS at 10:05

## 2019-02-19 RX ADMIN — METAXALONE 800 MG: 800 TABLET ORAL at 21:24

## 2019-02-19 RX ADMIN — METAXALONE 800 MG: 800 TABLET ORAL at 10:56

## 2019-02-19 RX ADMIN — LISINOPRIL 20 MG: 20 TABLET ORAL at 21:24

## 2019-02-19 RX ADMIN — METAXALONE 800 MG: 800 TABLET ORAL at 02:05

## 2019-02-19 RX ADMIN — OXYCODONE AND ACETAMINOPHEN 2 TABLET: 5; 325 TABLET ORAL at 07:17

## 2019-02-19 RX ADMIN — METAXALONE 800 MG: 800 TABLET ORAL at 15:59

## 2019-02-19 RX ADMIN — SODIUM CHLORIDE, PRESERVATIVE FREE 3 ML: 5 INJECTION INTRAVENOUS at 10:57

## 2019-02-19 RX ADMIN — OXYCODONE AND ACETAMINOPHEN 2 TABLET: 5; 325 TABLET ORAL at 17:08

## 2019-02-19 RX ADMIN — OXYCODONE AND ACETAMINOPHEN 2 TABLET: 5; 325 TABLET ORAL at 21:27

## 2019-02-19 RX ADMIN — SODIUM CHLORIDE 100 ML/HR: 4.5 INJECTION, SOLUTION INTRAVENOUS at 21:24

## 2019-02-19 RX ADMIN — OXYCODONE AND ACETAMINOPHEN 2 TABLET: 5; 325 TABLET ORAL at 11:52

## 2019-02-19 RX ADMIN — SENNOSIDES AND DOCUSATE SODIUM 1 TABLET: 8.6; 5 TABLET ORAL at 21:24

## 2019-02-19 NOTE — DISCHARGE PLACEMENT REQUEST
"Love Dueñas (58 y.o. Male)     Date of Birth Social Security Number Address Home Phone MRN    1960  [address] [id number] [phone] [id number]    Church Marital Status          Unknown        Admission Date Admission Type Admitting Provider Attending Provider Department, Room/Bed    2/18/19 Elective Jase Stark MD Werner, Joseph G, MD [address], 83/1    Discharge Date Discharge Disposition Discharge Destination                       Attending Provider:  Jase Stark MD    Allergies:  Promethazine, Penicillins    Isolation:  None   Infection:  None   Code Status:  CPR    Ht:  185.4 cm (72.99\")   Wt:  120 kg (264 lb)    Admission Cmt:  None   Principal Problem:  None                Active Insurance as of 2/18/2019     Primary Coverage     Payor Plan Insurance Group Employer/Plan Group    [employer]CIAL AET[employer] [id number]     Payor Plan Address Payor Plan Phone Number Payor Plan Fax Number Effective Dates    [address] [phone]  1/1/2016 - None Entered    [facility] [address]       Subscriber Name Subscriber Birth Date Member ID       LOVE DUEÑAS 1960 [id number]                 Emergency Contacts      (Rel.) Home Phone Work Phone Mobile Phone    Michelle Dueñas (Spouse) -- -- [phone]    Jd Gutierrez (Daughter) [phone] -- --    DueñasRula rivero (Daughter) [phone] -- --              "

## 2019-02-19 NOTE — PLAN OF CARE
Problem: Patient Care Overview  Goal: Plan of Care Review  Outcome: Ongoing (interventions implemented as appropriate)   02/18/19 2110 02/19/19 0659   Plan of Care Review   Progress --  improving   OTHER   Outcome Summary --  POD 1 L spine laminectomy/fusion. Mild hypotension, other VS WNL. A&Ox4. f/c d/c. Ambulated to BR Ax1. Incisional pain well controlled with combination of PO and IV opioids (PCA). Denies numbness & tingling BLE. PT pending. Continue to monitor VS, pain, activity.   Coping/Psychosocial   Plan of Care Reviewed With patient;spouse --

## 2019-02-19 NOTE — PROGRESS NOTES
Discharge Planning Assessment  [facility]     Patient Name: Royer Arceo  MRN: [id number]  Today's Date: 2/19/2019    Admit Date: 2/18/2019    Discharge Needs Assessment     Row Name 02/19/19 1721       Living Environment    Lives With  spouse    Name(s) of Who Lives With Patient  spouse Michelle Arceo [phone]    Current Living Arrangements  home/apartment/condo    Primary Care Provided by  self    Provides Primary Care For  no one    Family Caregiver if Needed  spouse    Family Caregiver Names  spouse Michelle Arceo [phone]    Quality of Family Relationships  helpful    Able to Return to Prior Arrangements  yes       Resource/Environmental Concerns    Resource/Environmental Concerns  home accessibility    Home Accessibility Concerns  stairs to enter home has 6 steps with 1 handrail to enter his single story home     Transportation Concerns  car, none       Transition Planning    Patient/Family Anticipates Transition to  home with family;home with help/services    Patient/Family Anticipated Services at Transition  home health care    Transportation Anticipated  family or friend will provide       Discharge Needs Assessment    Concerns to be Addressed  discharge planning    Equipment Currently Used at Home  walker, rolling    Anticipated Changes Related to Illness  none    Equipment Needed After Discharge  none    Outpatient/Agency/Support Group Needs  homecare agency    Discharge Facility/Level of Care Needs  home with home health    Offered/Gave Vendor List  yes    Patient's Choice of Community Agency(s)  [facility]- referral made to Maricarmen to follow for HH orders.     Current Discharge Risk  physical impairment        Discharge Plan     Row Name 02/19/19 172       Plan    Plan  Home with assistance from his wife and [facility]- referral made to Maricarmen to follow for HH orders.     Plan Comments  Met with the patient at bedside; explained role of CCP, verified facehseet and discussed discharge planning needs.  The  patient plans to retun home with assistance from his wife, has a walker and has 6 steps with 1 handrail to enter his single story home in [place] through the garage. The patient's PCP is Maile Dickens, pharmacy is [facility] in [place] but he is agreeable to using [facility].  The patient denies any trouble remembering to take his medication or with affforidng his medication, denies any HH/SNF history, was provided with HH/SNF list and is agreeable to using [facility] because [facility].  The patient was informed that P.T. is recommending HH services upon d/c.  Referral was made to Maricarmen to follow for [facility].  The patient denies any POA documents and states that his wife will transport him home upon d/c.  Follow to see if a 3 in 1 commode will be needed.   CCP will follow to assist with the patient's d/c home with [facility].  HO Ortiz        Destination      No service coordination in this encounter.      Durable Medical Equipment      No service coordination in this encounter.      Dialysis/Infusion      No service coordination in this encounter.      Home Medical Care      Service Provider Request Status Selected Services Address Phone Number Fax Number    [facility] Pending - Request Sent N/A [address] [place], [place] [id number] [phone]      [facility]      No service coordination in this encounter.          Demographic Summary     Row Name 02/19/19 1720       General Information    Admission Type  inpatient    Arrived From  home    Referral Source  physician;nursing    Reason for Consult  discharge planning    Preferred Language  English     Used During This Interaction  no        Functional Status     Row Name 02/19/19 1720       Functional Status    Usual Activity Tolerance  good    Current Activity Tolerance  fair       Functional Status, IADL    Medications  assistive equipment    Meal  Preparation  assistive equipment    Housekeeping  assistive equipment    Laundry  assistive equipment    Shopping  assistive equipment       Mental Status    General Appearance WDL  WDL       Mental Status Summary    Recent Changes in Mental Status/Cognitive Functioning  no changes        Psychosocial    No documentation.       Abuse/Neglect    No documentation.       Legal    No documentation.       Substance Abuse    No documentation.       Patient Forms     Row Name 02/19/19 7212       Patient Forms    Provider Choice List  Delivered    Delivered to  Patient    Method of delivery  In person            HO Rich

## 2019-02-19 NOTE — THERAPY EVALUATION
Acute Care - Physical Therapy Initial Evaluation  [facility]     Patient Name: Royer Arceo  : 1960  MRN: [id number]  Today's Date: 2019   Onset of Illness/Injury or Date of Surgery: 19  Date of Referral to PT: 19  Referring Physician: Lincoln      Admit Date: 2019    Visit Dx:     ICD-10-CM ICD-9-CM   1. Impaired mobility Z74.09 799.89   2. Retrolisthesis M43.10 733.90   3. Spinal stenosis of lumbar region with neurogenic claudication M48.062 724.03     Patient Active Problem List   Diagnosis   • HLD (hyperlipidemia)   • Osteoarthritis of multiple joints   • Adult hypothyroidism   • Borderline glaucoma   • Postoperative hypothyroidism   • Iron deficiency anemia due to chronic blood loss   • Occult blood in stools   • Retrolisthesis   • Spinal stenosis of lumbar region with neurogenic claudication     Past Medical History:   Diagnosis Date   • Arthritis    • Glaucoma    • History of anemia    • Hyperlipidemia    • Hypertension    • Hypothyroidism    • PONV (postoperative nausea and vomiting)    • Spinal stenosis      Past Surgical History:   Procedure Laterality Date   • ANAL FISTULOTOMY     • COLONOSCOPY  2011    normal per patient   • COLONOSCOPY N/A 2017    Procedure: COLONOSCOPY TO CECUM AND INTO TERMNAL ILEUM;  Surgeon: Lm Rowe MD;  Location: [facility] ENDOSCOPY;  Service:    • ENDOSCOPY N/A 2017    Procedure: ESOPHAGOGASTRODUODENOSCOPY WITH COLD BIOPSIES;  Surgeon: Lm Rowe MD;  Location: [facility] ENDOSCOPY;  Service:    • EYE SURGERY      clear lens exchange bilateral   • LUMBAR DISCECTOMY FUSION INSTRUMENTATION N/A 2019    Procedure: Right L3-4, L5-S1 laminectomy and L5-S1 fusion with instrumentation;  Surgeon: Jase Stark MD;  Location: [facility] MAIN OR;  Service: Orthopedic Spine   • THYROIDECTOMY     • TONSILLECTOMY     • WISDOM TOOTH EXTRACTION          PT ASSESSMENT (last 12 hours)      Physical Therapy Evaluation     Row Name  02/19/19 1047          PT Evaluation Time/Intention    Subjective Information  no complaints  -     Document Type  evaluation  -     Mode of Treatment  individual therapy;physical therapy  -     Patient Effort  good  -     Symptoms Noted During/After Treatment  none  -     Row Name 02/19/19 1047          General Information    Patient Profile Reviewed?  yes  -     Onset of Illness/Injury or Date of Surgery  02/18/19  -     Referring Physician  Lincoln  -     Patient Observations  cooperative;alert;agree to therapy  -     Patient/Family Observations  spouse bedside  -     General Observations of Patient  pt supine in bed no acute distress  -     Prior Level of Function  independent:  -     Equipment Currently Used at Home  none  -     Pertinent History of Current Functional Problem  POD 1 lami/fusion  -     Existing Precautions/Restrictions  fall;brace worn when out of bed;spinal  -     Risks Reviewed  patient and family:  -     Benefits Reviewed  patient and family:  -     Row Name 02/19/19 1047          Cognitive Assessment/Intervention- PT/OT    Orientation Status (Cognition)  oriented x 4  -     Follows Commands (Cognition)  WFL  -     Row Name 02/19/19 1047          Bed Mobility Assessment/Treatment    Bed Mobility Assessment/Treatment  supine-sit;sit-supine  -     Supine-Sit [place] (Bed Mobility)  supervision  -     Sit-Supine [place] (Bed Mobility)  not tested  -     Comment (Bed Mobility)  log rolling  -     Row Name 02/19/19 1047          Transfer Assessment/Treatment    Transfer Assessment/Treatment  sit-stand transfer;stand-sit transfer  -     Sit-Stand [place] (Transfers)  contact guard;verbal cues  -     Stand-Sit [place] (Transfers)  contact guard;verbal cues  -     Row Name 02/19/19 1047          Sit-Stand Transfer    Assistive Device (Sit-Stand Transfers)  walker, front-wheeled  -     Row Name 02/19/19 1047          Stand-Sit  "Transfer    Assistive Device (Stand-Sit Transfers)  walker, front-wheeled  -[facility] Name 02/19/19 1047          Gait/Stairs Assessment/Training    [place] Level (Gait)  contact guard;verbal cues  -     Assistive Device (Gait)  walker, front-wheeled  -     Distance in Feet (Gait)  100  -     Pattern (Gait)  swing-through  -     Deviations/Abnormal Patterns (Gait)  base of support, wide;barbara decreased  -     Comment (Gait/Stairs)  initially attempted w/o rwx however reaching for objects on unit/poor mechanics and admits to \"furniture walking\" at home, rec rwx use  -[facility] Name 02/19/19 1047          General ROM    GENERAL ROM COMMENTS  BLEs functional  -[facility] Name 02/19/19 1047          MMT (Manual Muscle Testing)    General MMT Comments  BLEs grossly at least 3/5  -[facility] Name 02/19/19 1047          Motor Assessment/Intervention    Additional Documentation  Therapeutic Exercise (Group);Therapeutic Exercise Interventions (Group)  -[facility] Name 02/19/19 1047          Therapeutic Exercise    Therapeutic Exercise  seated, lower extremities  -     Additional Documentation  Therapeutic Exercise ([facility])  -[facility] Name 02/19/19 1047          Lower Extremity Seated Therapeutic Exercise    Performed, Seated Lower Extremity (Therapeutic Exercise)  LAQ (long arc quad), knee extension;ankle dorsiflexion/plantarflexion glut sets  -     Exercise Type, Seated Lower Extremity (Therapeutic Exercise)  AROM (active range of motion)  -     Sets/Reps Detail, Seated Lower Extremity (Therapeutic Exercise)  1/5  -[facility] Name 02/19/19 1047          Pain Assessment    Additional Documentation  Pain Scale: Numbers Pre/Post-Treatment (Group)  -[facility] Name 02/19/19 1047          Pain Scale: Numbers Pre/Post-Treatment    Pain Scale: Numbers, Pretreatment  3/10  -     Pain Scale: Numbers, Post-Treatment  3/10  -     Pain Location - Orientation  incisional  -     Pain Location  back  -     " Pain Intervention(s)  Ambulation/increased activity  -     Row Name             Wound 02/18/19 0844 Other (See comments) back incision    Wound - Properties Group Date first assessed: 02/18/19  -JT Time first assessed: 0844  -JT Side: Other (See comments)  -JT Location: back  -JT Type: incision  -JT    Row Name 02/19/19 1047          Plan of Care Review    Plan of Care Reviewed With  patient  -     Row Name 02/19/19 1047          Physical Therapy Clinical Impression    Date of Referral to PT  02/19/19  -     Criteria for Skilled Interventions Met (PT Clinical Impression)  yes  -     Pathology/Pathophysiology Noted (Describe Specifically for Each System)  musculoskeletal;neuromuscular  -     Impairments Found (describe specific impairments)  gait, locomotion, and balance  -     Functional Limitations in Following Categories (Describe Specific Limitations)  self-care;work  -     Rehab Potential (PT Clinical Summary)  good, to achieve stated therapy goals  -     Row Name 02/19/19 1047          Physical Therapy Goals    Bed Mobility Goal Selection (PT)  bed mobility, PT goal 1  -     Transfer Goal Selection (PT)  transfer, PT goal 1  -     Gait Training Goal Selection (PT)  gait training, PT goal 1  -     Stairs Goal Selection (PT)  stairs, PT goal 1  -     Additional Documentation  Stairs Goal Selection (PT) (Row)  -     Row Name 02/19/19 1047          Bed Mobility Goal 1 (PT)    Activity/Assistive Device (Bed Mobility Goal 1, PT)  bed mobility activities, all  -     [place] Level/Cues Needed (Bed Mobility Goal 1, PT)  conditional independence  -     Time Frame (Bed Mobility Goal 1, PT)  long term goal (LTG);3 days  -     Row Name 02/19/19 1047          Transfer Goal 1 (PT)    Activity/Assistive Device (Transfer Goal 1, PT)  transfers, all;walker, rolling  -     [place] Level/Cues Needed (Transfer Goal 1, PT)  conditional independence  -     Time Frame (Transfer Goal 1, PT)   long term goal (LTG);3 days  -     Row Name 02/19/19 1047          Gait Training Goal 1 (PT)    Activity/Assistive Device (Gait Training Goal 1, PT)  walker, rolling;assistive device use  -     [place] Level (Gait Training Goal 1, PT)  conditional independence  -     Distance (Gait Goal 1, PT)  300  -     Time Frame (Gait Training Goal 1, PT)  long term goal (LTG);3 days  -     Row Name 02/19/19 1047          Stairs Goal 1 (PT)    Activity/Assistive Device (Stairs Goal 1, PT)  ascending stairs;descending stairs  -     [place] Level/Cues Needed (Stairs Goal 1, PT)  supervision required  -     Number of Stairs (Stairs Goal 1, PT)  6  -     Time Frame (Stairs Goal 1, PT)  long term goal (LTG);3 days  -     Row Name 02/19/19 1047          Positioning and Restraints    Pre-Treatment Position  in bed  -     Post Treatment Position  chair  -     In Chair  sitting;call light within reach;encouraged to call for assist;exit alarm on;with family/caregiver;notified [facility]  -       User Key  (r) = Recorded By, (t) = Taken By, (c) = Cosigned By    Initials Name Provider Type     Selena Sorto PT Physical Therapist    Laura Stevens RN Registered Nurse        Physical Therapy Education     Title: PT OT SLP Therapies (Done)     Topic: Physical Therapy (Done)     Point: Mobility training (Done)     Learning Progress Summary           Patient Acceptance, E, VU,NR by  at 2/19/2019 10:53 AM                   Point: Home exercise program (Done)     Learning Progress Summary           Patient Acceptance, E, VU,NR by  at 2/19/2019 10:53 AM                   Point: Body mechanics (Done)     Learning Progress Summary           Patient Acceptance, E, VU,NR by  at 2/19/2019 10:53 AM                   Point: Precautions (Done)     Learning Progress Summary           Patient Acceptance, E, VU,NR by  at 2/19/2019 10:53 AM                               User Key     Initials Effective Dates Name  Provider Type Discipline     04/03/18 -  Selena Sorto, PT Physical Therapist PT              PT Recommendation and Plan  Anticipated Discharge Disposition (PT): home with home health  Planned Therapy Interventions (PT Eval): balance training, bed mobility training, gait training, home exercise program, ROM (range of motion), stair training, strengthening, stretching, transfer training  Therapy Frequency (PT Clinical Impression): 2 times/day  Outcome Summary/Treatment Plan (PT)  Anticipated Discharge Disposition (PT): home with home health  Plan of Care Reviewed With: patient  Outcome Summary: pt presents w generalized weakness, altered gait mechanics post op lami/fusion. good tolerance to tsf OOB to chair and ambulation this AM. pt admits to 'furniture walking' improved mechanics w use of rwx. pt may benefit from skilled PT acutely to address funcitonal deficits, gait training for safe return to home.   Outcome Measures     Row Name 02/19/19 1000             How much help from another person do you currently need...    Turning from your back to your side while in flat bed without using bedrails?  4  -LH      Moving from lying on back to sitting on the side of a flat bed without bedrails?  4  -LH      Moving to and from a bed to a chair (including a wheelchair)?  4  -LH      Standing up from a chair using your arms (e.g., wheelchair, bedside chair)?  3  -LH      Climbing 3-5 steps with a railing?  3  -LH      To walk in hospital room?  3  -      AM-PAC 6 Clicks Score  21  -         Functional Assessment    Outcome Measure Options  AM-PAC 6 Clicks Basic Mobility (PT)  -        User Key  (r) = Recorded By, (t) = Taken By, (c) = Cosigned By    Initials Name Provider Type     Selena Sorto, PT Physical Therapist         Time Calculation:   PT Charges     Row Name 02/19/19 1055             Time Calculation    Start Time  1030  -      Stop Time  1049  -      Time Calculation (min)  19 min  -      PT  Received On  02/19/19  -      PT - Next Appointment  02/19/19  -      PT Goal Re-Cert Due Date  02/22/19  -        User Key  (r) = Recorded By, (t) = Taken By, (c) = Cosigned By    Initials Name Provider Type     Selena Sorto, PT Physical Therapist        Therapy Suggested Charges     Code   Minutes Charges    None           Therapy Charges for Today     Code Description Service Date Service Provider Modifiers Qty    73052629113 HC PT EVAL LOW COMPLEXITY 2 2/19/2019 Selena Sorto, PT GP 1    28970604157 HC PT THER PROC EA 15 MIN 2/19/2019 Selena Sorto, PT GP 1          PT G-Codes  Outcome Measure Options: AM-PAC 6 Clicks Basic Mobility (PT)  AM-PAC 6 Clicks Score: 21      Selena Sorto, PT  2/19/2019

## 2019-02-19 NOTE — PROGRESS NOTES
AVSS.  Complains of back pain, not unexpected.  Leg pain is better.  Moves feet well.  Hemoglobin 11.1 plan PT, mobilize.  Home tomorrow

## 2019-02-19 NOTE — THERAPY TREATMENT NOTE
Acute Care - Physical Therapy Treatment Note  [facility]     Patient Name: Royer Arceo  : 1960  MRN: [id number]  Today's Date: 2019  Onset of Illness/Injury or Date of Surgery: 19  Date of Referral to PT: 19  Referring Physician: Lincoln    Admit Date: 2019    Visit Dx:    ICD-10-CM ICD-9-CM   1. Impaired mobility Z74.09 799.89   2. Retrolisthesis M43.10 733.90   3. Spinal stenosis of lumbar region with neurogenic claudication M48.062 724.03     Patient Active Problem List   Diagnosis   • HLD (hyperlipidemia)   • Osteoarthritis of multiple joints   • Adult hypothyroidism   • Borderline glaucoma   • Postoperative hypothyroidism   • Iron deficiency anemia due to chronic blood loss   • Occult blood in stools   • Retrolisthesis   • Spinal stenosis of lumbar region with neurogenic claudication       Therapy Treatment    Rehabilitation Treatment Summary     Row Name 19 1500             Treatment Time/Intention    Discipline  physical therapy assistant  -CW      Document Type  therapy note (daily note)  -CW      Subjective Information  no complaints  -CW      Mode of Treatment  individual therapy;physical therapy  -CW      Therapy Frequency (PT Clinical Impression)  2 times/day  -CW      Patient Effort  good  -CW      Existing Precautions/Restrictions  fall;brace worn when out of bed;spinal  -CW      Recorded by [CW] Vinicio Garduno P, PTA 19 1517      Row Name 19 1500             Vital Signs    O2 Delivery Pre Treatment  room air  -CW      Recorded by [CW] Vinicio Garduno, PTA 19 1517      Row Name 19 1500             Cognitive Assessment/Intervention- PT/OT    Orientation Status (Cognition)  oriented x 4  -CW      Follows Commands (Cognition)  WFL  -CW      Personal Safety Interventions  fall prevention program maintained;gait belt;muscle strengthening facilitated;nonskid shoes/slippers when out of bed  -CW      Recorded by [CW] Vinicio Garduno,  PTA 02/19/19 1517      Row Name 02/19/19 1500             Bed Mobility Assessment/Treatment    Bed Mobility Assessment/Treatment  supine-sit;sit-supine  -CW      Supine-Sit [place] (Bed Mobility)  --  -CW      Sit-Supine [place] (Bed Mobility)  not tested  -CW      Comment (Bed Mobility)  in bathroom and then chair  -CW      Recorded by [CW] Vinicio Garduno, PTA 02/19/19 1517      Row Name 02/19/19 1500             Transfer Assessment/Treatment    Transfer Assessment/Treatment  sit-stand transfer;stand-sit transfer  -CW      Recorded by [CW] Vinicio Garduno, PTA 02/19/19 1517      Row Name 02/19/19 1500             Sit-Stand Transfer    Sit-Stand [place] (Transfers)  contact guard;verbal cues  -CW      Assistive Device (Sit-Stand Transfers)  walker, front-wheeled  -CW      Recorded by [CW] Vinicio Garduno, PTA 02/19/19 1517      Row Name 02/19/19 1500             Stand-Sit Transfer    Stand-Sit [place] (Transfers)  contact guard;verbal cues  -CW      Assistive Device (Stand-Sit Transfers)  walker, front-wheeled  -CW      Recorded by [CW] Vinicio Garduno, PTA 02/19/19 1517      Row Name 02/19/19 1500             Gait/Stairs Assessment/Training    [place] Level (Gait)  contact guard;verbal cues  -CW      Assistive Device (Gait)  walker, front-wheeled  -CW      Distance in Feet (Gait)  450  -CW      Pattern (Gait)  swing-through  -CW      Deviations/Abnormal Patterns (Gait)  base of support, wide;barbara decreased  -CW      [place] Level (Stairs)  supervision  -CW      Handrail Location (Stairs)  left side (ascending)  -CW      Number of Steps (Stairs)  5  -CW      Ascending Technique (Stairs)  step-to-step  -CW      Descending Technique (Stairs)  step-to-step  -CW      Recorded by [CW] Vinicio Garduno, PTA 02/19/19 1517      Row Name 02/19/19 1500             Positioning and Restraints    Pre-Treatment Position  bathroom  -CW      Post Treatment Position  chair  -CW       In Chair  notified nsg;reclined;call light within reach;encouraged to call for assist;exit alarm on  -CW      Recorded by [CW] Vinicio Garduno, PTA 02/19/19 1517      Row Name 02/19/19 1500             Pain Scale: Numbers Pre/Post-Treatment    Pain Scale: Numbers, Pretreatment  3/10  -CW      Pain Scale: Numbers, Post-Treatment  3/10  -CW      Pain Location - Orientation  incisional  -CW      Pain Location  back  -CW      Pain Intervention(s)  Repositioned;Ambulation/increased activity  -CW      Recorded by [CW] Vinicio Garduno, PTA 02/19/19 1517      Row Name                Wound 02/18/19 0844 Other (See comments) back incision    Wound - Properties Group Date first assessed: 02/18/19 [JT] Time first assessed: 0844 [JT] Side: Other (See comments) [JT] Location: back [JT] Type: incision [JT] Recorded by:  [JT] Laura Hector RN 02/18/19 0844    Row Name 02/19/19 1500             Outcome Summary/Treatment Plan (PT)    Anticipated Discharge Disposition (PT)  home with home health  -CW      Recorded by [CW] Vinicio Garduno, PTA 02/19/19 1517        User Key  (r) = Recorded By, (t) = Taken By, (c) = Cosigned By    Initials Name Effective Dates Discipline    JT Laura Hector RN 06/16/16 -  Nurse    CW Vinicio Garduno, PTA 03/07/18 -  PT          Wound 02/18/19 0844 Other (See comments) back incision (Active)   Dressing Appearance dry;intact;no drainage 2/19/2019 10:55 AM   Closure [place] 2/19/2019 10:55 AM   Base dressing in place, unable to visualize 2/19/2019 10:55 AM   Drainage Amount none 2/19/2019 10:55 AM       Rehab Goal Summary     Row Name 02/19/19 1047             Physical Therapy Goals    Bed Mobility Goal Selection (PT)  bed mobility, PT goal 1  -LH      Transfer Goal Selection (PT)  transfer, PT goal 1  -      Gait Training Goal Selection (PT)  gait training, PT goal 1  -LH      Stairs Goal Selection (PT)  stairs, PT goal 1  -         Bed Mobility Goal 1 (PT)     Activity/Assistive Device (Bed Mobility Goal 1, PT)  bed mobility activities, all  -      [place] Level/Cues Needed (Bed Mobility Goal 1, PT)  conditional independence  -      Time Frame (Bed Mobility Goal 1, PT)  long term goal (LTG);3 days  -         Transfer Goal 1 (PT)    Activity/Assistive Device (Transfer Goal 1, PT)  transfers, all;walker, rolling  -      [place] Level/Cues Needed (Transfer Goal 1, PT)  conditional independence  -LH      Time Frame (Transfer Goal 1, PT)  long term goal (LTG);3 days  -         Gait Training Goal 1 (PT)    Activity/Assistive Device (Gait Training Goal 1, PT)  walker, rolling;assistive device use  -      [place] Level (Gait Training Goal 1, PT)  conditional independence  -      Distance (Gait Goal 1, PT)  300  -      Time Frame (Gait Training Goal 1, PT)  long term goal (LTG);3 days  -         Stairs Goal 1 (PT)    Activity/Assistive Device (Stairs Goal 1, PT)  ascending stairs;descending stairs  -      [place] Level/Cues Needed (Stairs Goal 1, PT)  supervision required  -      Number of Stairs (Stairs Goal 1, PT)  6  -      Time Frame (Stairs Goal 1, PT)  long term goal (LTG);3 days  -        User Key  (r) = Recorded By, (t) = Taken By, (c) = Cosigned By    Initials Name Provider Type Discipline     Selena Sorto, PT Physical Therapist PT          Physical Therapy Education     Title: PT OT SLP Therapies (Done)     Topic: Physical Therapy (Done)     Point: Mobility training (Done)     Learning Progress Summary           Patient Acceptance, E, VU,NR by  at 2/19/2019 10:53 AM                   Point: Home exercise program (Done)     Learning Progress Summary           Patient Acceptance, E, VU,NR by  at 2/19/2019 10:53 AM                   Point: Body mechanics (Done)     Learning Progress Summary           Patient Acceptance, E, VU,NR by  at 2/19/2019 10:53 AM                   Point: Precautions (Done)     Learning Progress  Summary           Patient Acceptance, ELETICIA,NR by  at 2/19/2019 10:53 AM                               User Key     Initials Effective Dates Name Provider Type Discipline     04/03/18 -  Selena Sorto, PT Physical Therapist PT                PT Recommendation and Plan  Anticipated Discharge Disposition (PT): home with home health  Therapy Frequency (PT Clinical Impression): 2 times/day  Outcome Summary/Treatment Plan (PT)  Anticipated Discharge Disposition (PT): home with home health  Outcome Measures     Row Name 02/19/19 1000             How much help from another person do you currently need...    Turning from your back to your side while in flat bed without using bedrails?  4  -LH      Moving from lying on back to sitting on the side of a flat bed without bedrails?  4  -LH      Moving to and from a bed to a chair (including a wheelchair)?  4  -LH      Standing up from a chair using your arms (e.g., wheelchair, bedside chair)?  3  -LH      Climbing 3-5 steps with a railing?  3  -LH      To walk in hospital room?  3  -      AM-PAC 6 Clicks Score  21  -         Functional Assessment    Outcome Measure Options  AM-PAC 6 Clicks Basic Mobility (PT)  -        User Key  (r) = Recorded By, (t) = Taken By, (c) = Cosigned By    Initials Name Provider Type     Selena Sorto, PT Physical Therapist         Time Calculation:   PT Charges     Row Name 02/19/19 1517 02/19/19 1055          Time Calculation    Start Time  1500  -  1030  -     Stop Time  1517  -CW  1049  -     Time Calculation (min)  17 min  -  19 min  -     PT Received On  02/19/19  -  02/19/19  -     PT - Next Appointment  02/20/19  -  02/19/19  -     PT Goal Re-Cert Due Date  --  02/22/19  -       User Key  (r) = Recorded By, (t) = Taken By, (c) = Cosigned By    Initials Name Provider Type     Selena Sorto, PT Physical Therapist    Vinicio Garces, PTA Physical Therapy Assistant        Therapy Suggested Charges     Code    Minutes Charges    None           Therapy Charges for Today     Code Description Service Date Service Provider Modifiers Qty    39398421957 HC PT THER PROC EA 15 MIN 2/19/2019 Vinicio Garduno, PTA GP 1          PT G-Codes  Outcome Measure Options: AM-PAC 6 Clicks Basic Mobility (PT)  AM-PAC 6 Clicks Score: 21    Vinicio Garduno, YAIR  2/19/2019

## 2019-02-19 NOTE — NURSING NOTE
WOCN consult regarding redness buttock crease.  Moisture related intertrigo. May apply moisture barrier cream

## 2019-02-19 NOTE — PLAN OF CARE
Problem: Patient Care Overview  Goal: Plan of Care Review   02/19/19 1058   OTHER   Outcome Summary pt presents w generalized weakness, altered gait mechanics post op lami/fusion. good tolerance to tsf OOB to chair and ambulation this AM. pt admits to 'furniture walking' improved mechanics w use of rwx. pt may benefit from skilled PT acutely to address funcitonal deficits, gait training for safe return to home.    Coping/Psychosocial   Plan of Care Reviewed With patient

## 2019-02-19 NOTE — PAYOR COMM NOTE
"UR CONTACT:  AMRIT  P: [phone]        F: [phone]        Love Dueñas (58 y.o. Male)     Date of Birth Social Security Number Address [place] Phone MRN    1960  [address] [facility] [address] [phone] [id number]    Amish Marital Status          Unknown        Admission Date Admission Type Admitting Provider Attending Provider Department, Room/Bed    2/18/19 Elective Jase Stark MD Werner, Joseph G, MD [address], P583/1    Discharge Date Discharge Disposition Discharge Destination                       Attending Provider:  aJse Stark MD    Allergies:  Promethazine, Penicillins    Isolation:  None   Infection:  None   Code Status:  CPR    Ht:  185.4 cm (72.99\")   Wt:  120 kg (264 lb)    Admission Cmt:  None   Principal Problem:  None                Active Insurance as of 2/18/2019     Primary Coverage     Payor Plan Insurance Group Employer/Plan Group    [employer] AETNA [id number]     Payor Plan Address Payor Plan Phone Number Payor Plan Fax Number Effective Dates    [address] [phone]  1/1/2016 - None Entered    [facility] [address]       Subscriber Name Subscriber Birth Date Member ID       LOVE DUEÑAS 1960 [id number]                 Emergency Contacts      (Rel.) Home Phone Work Phone Mobile Phone    Michelle Dueñas (Spouse) -- -- [phone]    FromJd deleon (Daughter) [phone] -- --    Rula Dueñas (Daughter) [phone] -- --               History & Physical      Jase Stark MD at 2/18/2019  7:20 AM        H&P updated. The patient was examined and He has a bit of ankle pain today on the right where there is minimal tenderness.  He states he has a history of arthritis but this actually could be radicular.  Additionally the insurance failed to approve payment for bone marrow aspiration and bone graft substitute placement calling it experimental.  This is untrue but I told the " patient that were this myself I would not pay out-of-pocket for these amenities which are helpful but not necessary to the success of the fusion.  His likelihood for success remains high despite omitting these.  He agrees and wants to proceed    Electronically signed by Jase Stark MD at 2/18/2019  7:23 AM   Source Note             New patient or new problem visit    Chief Complaint   Patient presents with   • Lumbar Spine - Pain       HPI: He complains of chronic low back pain but a 6 month history of increased back pain and radiating pain into the right lower extremity in the buttock thigh and leg.  He has failed to improve with conservative care including physical therapy, chiropractic, medications.  An epidural injection helped somewhat and he has another one is planned.  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  He is seen today at request of Dr. Ordaz and he has also seen Dr. Mckenzie    PFSH: See chart- reviewed    Review of Systems   Constitutional: Negative for chills, fever and unexpected weight change.   HENT: Negative for trouble swallowing and voice change.    Eyes: Negative for visual disturbance.   Respiratory: Negative for cough and shortness of breath.    Cardiovascular: Negative for chest pain and leg swelling.   Gastrointestinal: Negative for abdominal pain, nausea and vomiting.   Endocrine: Negative for cold intolerance and heat intolerance.   Genitourinary: Negative for difficulty urinating, frequency and urgency.   Skin: Negative for rash and wound.   Allergic/Immunologic: Negative for immunocompromised state.   Neurological: Negative for weakness and numbness.   Hematological: Does not bruise/bleed easily.   Psychiatric/Behavioral: Negative for dysphoric mood. The patient is not nervous/anxious.        PE: Constitutional: Vital signs above-noted.  Awake, alert and oriented    Psychiatric:  Affect and insight do not appear grossly disturbed.    Pulmonary: Breathing is unlabored, color is good.    Skin: Warm, dry and normal turgor    Cardiac:  pedal pulses intact.  No edema.    Eyesight and hearing appear adequate for examination purposes      Musculoskeletal:  There is mild tenderness to percussion and palpation of the spine. Motion appears slightly stiff.  Posture is unremarkable to coronal and sagittal inspection.    The skin about the area is intact.  There is no palpable or visible deformity.  There is no local spasm.       Neurologic:   Reflexes are absent in the patellae and achilles.   Motor function is undisturbed in quadriceps, EHL, and gastrocnemius      Sensation appears symmetrically intact to light touch   .  In the bilateral lower extremities there is no evidence of atrophy.   Clonus is absent..  Gait appears undisturbed.  SLR test negative      MEDICAL DECISION MAKING    XRAY: Plain film x-rays of the lumbar spine show slight scoliosis and multilevel disc degeneration.  There is marketed disc degeneration at L1 2 and then moderate disc space narrowing at L5-S1 where a retrolisthesis is noted on flexion-extension x-rays this increases to about 6 mm and reduces to near normal on the flexion images.  It is about 3 mm on the myelogram images.  Myelogram demonstrates a filling defect on the right side at L3 4.  It looks good otherwise.  Post myelogram CT scan demonstrates a straight dural defect at L3 4 in continuity with facet joint at that level which crowds the roots into the remaining space and significantly narrows contrast flow.  At L5-S1 a large medial osteophyte from the facet joint at that level is causing direct impingement of the passing S1 root and there is a significant amount of foraminal stenosis as well which may be causing L5 radiculopathy.  MRI scan shows similar findings and confirms the lesion at L3 4 to the be a synovial cyst in all likelihood.  I reviewed the radiologist  reports on all of these and the agree with them except for the MRI were no mention is made of the significant finding on the right the L5-S1 facet and foramen which are clearly stenotic.    Other: Dr. Mckenzie recommended pain management after initial recommendation for surgical decompression.    Impression: L3 4 right synovial cyst, L5-S1 retrolisthesis with spinal stenosis primarily right subarticular and foraminal.  Mostly radiating pain but a large component of axial pain as well.    Plan: He is miserable with pain and it dramatically impacting his activity inlays that I think can be highly reliably improved upon with surgery.  In this case I would recommend a right L3 4 laminectomy and excision of synovial cyst, and an L5-S1 laminectomy and fusion with instrumentation.  This would likely need his radicular pain and improve at least his back pain although it's unlikely to completely alleviate his axial pain.  I told him I would recommend against a more aggressive attempt to treat the axial pain with a longer fusion.  I discussed the risks and benefits of posterior spinal fusion, including where applicable, laminectomy.  Risks include adverse anesthetic events such as death, stroke and myocardial infarction.  More specific surgical risks include infection, nonunion, hardware failure, spinal fluid leakage, nerve root injury or paralysis, visceral or vascular injury, persistent pain, deep venous thrombosis, and pulmonary embolism among others. There is a 70 to 90 % chance of success.   Alternatives have been discussed.  After careful consideration the patient wishes to proceed with right L3 4, and L5-S1 laminectomy with L5-S1 fusion with instrumentation.    I thank Dr. Ordaz for this kind referral .     Electronically signed by Jase Stark MD at 1/29/2019 11:23 AM             Jase Stark MD at 1/29/2019 10:00 AM          New patient or new problem visit    Chief Complaint   Patient presents with   •  Lumbar Spine - Pain       HPI: He complains of chronic low back pain but a 6 month history of increased back pain and radiating pain into the right lower extremity in the buttock thigh and leg.  He has failed to improve with conservative care including physical therapy, chiropractic, medications.  An epidural injection helped somewhat and he has another one is planned.  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  He is seen today at request of Dr. Ordaz and he has also seen Dr. Mckenzie    PFSH: See chart- reviewed    Review of Systems   Constitutional: Negative for chills, fever and unexpected weight change.   HENT: Negative for trouble swallowing and voice change.    Eyes: Negative for visual disturbance.   Respiratory: Negative for cough and shortness of breath.    Cardiovascular: Negative for chest pain and leg swelling.   Gastrointestinal: Negative for abdominal pain, nausea and vomiting.   Endocrine: Negative for cold intolerance and heat intolerance.   Genitourinary: Negative for difficulty urinating, frequency and urgency.   Skin: Negative for rash and wound.   Allergic/Immunologic: Negative for immunocompromised state.   Neurological: Negative for weakness and numbness.   Hematological: Does not bruise/bleed easily.   Psychiatric/Behavioral: Negative for dysphoric mood. The patient is not nervous/anxious.        PE: Constitutional: Vital signs above-noted.  Awake, alert and oriented    Psychiatric: Affect and insight do not appear grossly disturbed.    Pulmonary: Breathing is unlabored, color is good.    Skin: Warm, dry and normal turgor    Cardiac:  pedal pulses intact.  No edema.    Eyesight and hearing appear adequate for examination purposes      Musculoskeletal:  There is mild tenderness to percussion and palpation of the spine. Motion appears slightly stiff.  Posture is unremarkable to coronal and sagittal  inspection.    The skin about the area is intact.  There is no palpable or visible deformity.  There is no local spasm.       Neurologic:   Reflexes are absent in the patellae and achilles.   Motor function is undisturbed in quadriceps, EHL, and gastrocnemius      Sensation appears symmetrically intact to light touch   .  In the bilateral lower extremities there is no evidence of atrophy.   Clonus is absent..  Gait appears undisturbed.  SLR test negative      MEDICAL DECISION MAKING    XRAY: Plain film x-rays of the lumbar spine show slight scoliosis and multilevel disc degeneration.  There is marketed disc degeneration at L1 2 and then moderate disc space narrowing at L5-S1 where a retrolisthesis is noted on flexion-extension x-rays this increases to about 6 mm and reduces to near normal on the flexion images.  It is about 3 mm on the myelogram images.  Myelogram demonstrates a filling defect on the right side at L3 4.  It looks good otherwise.  Post myelogram CT scan demonstrates a straight dural defect at L3 4 in continuity with facet joint at that level which crowds the roots into the remaining space and significantly narrows contrast flow.  At L5-S1 a large medial osteophyte from the facet joint at that level is causing direct impingement of the passing S1 root and there is a significant amount of foraminal stenosis as well which may be causing L5 radiculopathy.  MRI scan shows similar findings and confirms the lesion at L3 4 to the be a synovial cyst in all likelihood.  I reviewed the radiologist reports on all of these and the agree with them except for the MRI were no mention is made of the significant finding on the right the L5-S1 facet and foramen which are clearly stenotic.    Other: Dr. Mckenzie recommended pain management after initial recommendation for surgical decompression.    Impression: L3 4 right synovial cyst, L5-S1 retrolisthesis with spinal stenosis primarily right subarticular and foraminal.   Mostly radiating pain but a large component of axial pain as well.    Plan: He is miserable with pain and it dramatically impacting his activity inlays that I think can be highly reliably improved upon with surgery.  In this case I would recommend a right L3 4 laminectomy and excision of synovial cyst, and an L5-S1 laminectomy and fusion with instrumentation.  This would likely need his radicular pain and improve at least his back pain although it's unlikely to completely alleviate his axial pain.  I told him I would recommend against a more aggressive attempt to treat the axial pain with a longer fusion.  I discussed the risks and benefits of posterior spinal fusion, including where applicable, laminectomy.  Risks include adverse anesthetic events such as death, stroke and myocardial infarction.  More specific surgical risks include infection, nonunion, hardware failure, spinal fluid leakage, nerve root injury or paralysis, visceral or vascular injury, persistent pain, deep venous thrombosis, and pulmonary embolism among others. There is a 70 to 90 % chance of success.   Alternatives have been discussed.  After careful consideration the patient wishes to proceed with right L3 4, and L5-S1 laminectomy with L5-S1 fusion with instrumentation.    I thank Dr. Ordaz for this kind referral .    Electronically signed by Jase Stark MD at 1/29/2019 11:23 AM       ICU Vital Signs     Row Name 02/19/19 0530 02/18/19 2043 02/18/19 1615 02/18/19 1442 02/18/19 1423       Vitals    Temp  97.8 °F (36.6 °C)  98.6 °F (37 °C)  98 °F (36.7 °C)  --  98 °F (36.7 °C)    Temp src  Oral  Oral  Oral  --  Oral    Pulse  74  82  81  96  98    Heart Rate Source  Monitor  Monitor  Monitor  Monitor  Monitor    Resp  16  16  16  16  16    Resp Rate Source  Visual  Visual  Visual  Visual  Visual    BP  93/55  108/65  123/63  122/76  125/72    Noninvasive MAP (mmHg)  --  --  --  --  103    BP Location  Right arm  Right arm  Right arm   Right arm  Left arm    BP Method  Automatic  Automatic  Automatic  Automatic  Automatic    Patient Position  Lying  Lying  Lying  Lying  Lying       Oxygen Therapy    SpO2  95 %  94 %  98 %  95 %  97 %    Pulse Oximetry Type  Continuous  Continuous  Continuous  Continuous  Continuous    Device (Oxygen Therapy)  room air  room air  nasal cannula  room air  nasal cannula    Flow (L/min)  --  --  2  --  2        Lines, Drains & Airways    Active LDAs     Name:   Placement date:   Placement time:   Site:   Days:    Peripheral IV 02/18/19 0540 Right Hand   02/18/19 0540    Hand   1                Hospital Medications (active)       Dose Frequency Start End    bisacodyl (DULCOLAX) suppository 10 mg 10 mg Daily PRN 2/18/2019     Sig - Route: Insert 1 suppository into the rectum Daily As Needed for Constipation. - Rectal    dorzolamide (TRUSOPT) 2 % ophthalmic solution 1 drop 1 drop Nightly 2/18/2019     Sig - Route: Administer 1 drop to the right eye Every Night. - Right Eye    HYDROmorphone (DILAUDID) PCA 0.2 mg/ml 50 mL syringe  Continuous 2/18/2019 3/4/2019    Sig - Route: Infuse  into a venous catheter Continuous. - Intravenous    lactated ringers infusion 9 mL/hr Continuous 2/18/2019     Sig - Route: Infuse 9 mL/hr into a venous catheter Continuous. - Intravenous    levothyroxine (SYNTHROID, LEVOTHROID) tablet 175 mcg 175 mcg Every Morning Before Breakfast 2/19/2019     Sig - Route: Take 1 tablet by mouth Every Morning Before Breakfast. - Oral    lisinopril (PRINIVIL,ZESTRIL) tablet 20 mg 20 mg Nightly 2/18/2019     Sig - Route: Take 1 tablet by mouth Every Night. - Oral    metaxalone (SKELAXIN) tablet 800 mg 800 mg 3 Times Daily 2/18/2019     Sig - Route: Take 1 tablet by mouth 3 (Three) Times a Day. - Oral    naloxone (NARCAN) injection 0.1 mg 0.1 mg Every 5 Minutes PRN 2/18/2019     Sig - Route: Infuse 0.25 mL into a venous catheter Every 5 (Five) Minutes As Needed for Opioid Reversal or Respiratory Depression  "(see administration instructions). - Intravenous    ondansetron (ZOFRAN) injection 4 mg 4 mg Every 6 Hours PRN 2/18/2019     Sig - Route: Infuse 2 mL into a venous catheter Every 6 (Six) Hours As Needed for Nausea or Vomiting. - Intravenous    Linked Group 1:  \"Or\" Linked Group Details        ondansetron (ZOFRAN) tablet 4 mg 4 mg Every 6 Hours PRN 2/18/2019     Sig - Route: Take 1 tablet by mouth Every 6 (Six) Hours As Needed for Nausea or Vomiting. - Oral    Linked Group 1:  \"Or\" Linked Group Details        ondansetron ODT (ZOFRAN-ODT) disintegrating tablet 4 mg 4 mg Every 6 Hours PRN 2/18/2019     Sig - Route: Take 1 tablet by mouth Every 6 (Six) Hours As Needed for Nausea or Vomiting. - Oral    Linked Group 1:  \"Or\" Linked Group Details        oxyCODONE-acetaminophen (PERCOCET) 5-325 MG per tablet 2 tablet 2 tablet Every 4 Hours PRN 2/18/2019 2/28/2019    Sig - Route: Take 2 tablets by mouth Every 4 (Four) Hours As Needed for Moderate Pain  or Severe Pain . - Oral    polyethylene glycol 3350 powder (packet) 17 g Daily 2/18/2019     Sig - Route: Take 17 g by mouth Daily. - Oral    sennosides-docusate sodium (SENOKOT-S) 8.6-50 MG tablet 1 tablet 1 tablet 2 Times Daily 2/18/2019     Sig - Route: Take 1 tablet by mouth 2 (Two) Times a Day. - Oral    sodium chloride 0.45 % infusion 100 mL/hr Continuous 2/18/2019     Sig - Route: Infuse 100 mL/hr into a venous catheter Continuous. - Intravenous    sodium chloride 0.9 % flush 1-10 mL 1-10 mL As Needed 2/18/2019     Sig - Route: Infuse 1-10 mL into a venous catheter As Needed for Line Care. - Intravenous    sodium chloride 0.9 % flush 3 mL 3 mL Every 12 Hours Scheduled 2/18/2019     Sig - Route: Infuse 3 mL into a venous catheter Every 12 (Twelve) Hours. - Intravenous    temazepam (RESTORIL) capsule 15 mg 15 mg Nightly PRN 2/18/2019 2/28/2019    Sig - Route: Take 1 capsule by mouth At Night As Needed for Sleep. - Oral    vancomycin 1750 mg/500 mL 0.9% NS IVPB ([facility]) 15 " mg/kg × 120 kg Every 12 Hours 2/18/2019 2/18/2019    Sig - Route: Infuse 500 mL into a venous catheter Every 12 (Twelve) Hours. - Intravenous          Operative/Procedure Notes       Jase Stark MD at 2/18/2019  8:14 AM  Version 1 of 1       Operative note    Pre-op diagnosis: Right L3-4 synovial cyst, L5-S1 retrolisthesis with instability and spinal stenosis    Postoperative diagnosis: Same    Procedure: Right L3-4 laminectomy medial facetectomy foraminotomy and excision of synovial cyst and L5-S1 laminectomy medial facetectomy foraminotomy and bilateral posterolateral fusion with [employer] expedient instrumentation with local bone graft    Surgeon: Jase Stark Jr, M.D.    Asst.: Zuleika Mcnulty    EBL: 200 cc    Anesthetic: Gen.    Condition: Satisfactory      Description of procedure: Patient was anesthetized and positioned prone.  Bony prominences were well padded.  The back was prepped and draped in sterile fashion.  Incision was made down to lumbodorsal fascia.  The muscle was stripped subperiosteally to the tips of transverse processes.  Curettes and rongeurs removed adherent soft tissue.  Packs were placed for hemostasis.  The graft was decorticated.  A Steffee probe was inserted at the intersection of the anatomic landmarks.  A flexible probe was inserted to check the integrity of the pedicle.  Screws were placed at L5 and S1 bilaterally.  Contoured rods were later added, nuts were tightened and torqued.  Biplanar image intensification showed appropriate screw position and anatomic level and satisfactory posture.    I performed a laminectomy for decompression.   The interspinous ligament was excised.  The upper lamina was removed along its caudal aspect out to within 8-10 mm of the pars intra-articularis.  A small portion of the cephalad aspect of the caudal lamina was excised with a Kerrison rongeur.  A right medial facetectomy removed a large medial spur which was impinging the passing S1 root and  probably the exiting L5 above as well..  A high-speed mahin was used as well.  Foraminotomies were accomplished with a foraminotomy rongeur.   The disc was determined to be not impinging and stable and was not excised.  I then counted up to L3-4 where a separate incision was made on the right.  Subperiosteal dissection out to the medial aspect of L3-4 joint was performed.  A laminectomy was then made on the right side and the medial third of the L3-4 facet was removed along with a pea-sized synovial cyst.  I could then easily retract the shoulder of the 4th nerve root medially and passive ball probe along the course of the third root above without difficulty.  A tiny dural, but not arachnoid tear slightly anterior to the shoulder of the 4th nerve root was noted and later treated with Tisseel dural sealant agent..  Upon completion of the decompression I could pass a ball probe through the affected foramina, and easily retract  the nerve roots  toward the midline.  Bleeding was controlled with bipolar cautery,and Gelfoam with thrombin and/ or FloSeal hemostatic matrix.     bone from decortication of the graft bed was cleaned at the back table throughout the case and available for bone graft.  The morcellized bone was placed in the decorticated lateral gutter bilaterally.  .  Hemostasis was assured.  The wound was then closed with running and interrupted #1 Vicryl for the dorsal fascia, 2-0 Vicryl subcutaneously, then 4-0 Monocryl and Dermabond for the skin.  A sterile dressing was applied.  The patient is about to be recovered.    Electronically signed by Jase Stark MD at 2/18/2019 12:35 PM          Physician Progress Notes       Jase Stark MD at 2/19/2019  8:15 AM        AVSS.  Complains of back pain, not unexpected.  Leg pain is better.  Moves feet well.  Hemoglobin 11.1 plan PT, mobilize.  Home tomorrow    Electronically signed by Jase Stark MD at 2/19/2019  8:16 AM           McArdle, Jacklyn,  RN   Registered Nurse      Plan of Care   Signed   Date of Service:  2/19/2019  8:44 AM               Signed           Problem: Patient Care Overview  Goal: Plan of Care Review  Outcome: Ongoing (interventions implemented as appropriate)    02/18/19 2110 02/19/19 0659   Plan of Care Review   Progress --  improving   OTHER   Outcome Summary --  POD 1 L spine laminectomy/fusion. Mild hypotension, other VS WNL. A&Ox4. f/c d/c. Ambulated to BR Ax1. Incisional pain well controlled with combination of PO and IV opioids (PCA). Denies numbness & tingling BLE. PT pending. Continue to monitor VS, pain, activity.

## 2019-02-20 LAB
HCT VFR BLD AUTO: 31.6 % (ref 37.5–51)
HGB BLD-MCNC: 10 G/DL (ref 13–17.7)

## 2019-02-20 PROCEDURE — 85018 HEMOGLOBIN: CPT | Performed by: ORTHOPAEDIC SURGERY

## 2019-02-20 PROCEDURE — 94799 UNLISTED PULMONARY SVC/PX: CPT

## 2019-02-20 PROCEDURE — 99024 POSTOP FOLLOW-UP VISIT: CPT | Performed by: ORTHOPAEDIC SURGERY

## 2019-02-20 PROCEDURE — 97110 THERAPEUTIC EXERCISES: CPT

## 2019-02-20 PROCEDURE — 85014 HEMATOCRIT: CPT | Performed by: ORTHOPAEDIC SURGERY

## 2019-02-20 RX ORDER — HYDROCODONE BITARTRATE AND ACETAMINOPHEN 7.5; 325 MG/1; MG/1
1 TABLET ORAL EVERY 4 HOURS PRN
Status: DISCONTINUED | OUTPATIENT
Start: 2019-02-20 | End: 2019-02-21 | Stop reason: HOSPADM

## 2019-02-20 RX ORDER — HYDROCODONE BITARTRATE AND ACETAMINOPHEN 7.5; 325 MG/1; MG/1
2 TABLET ORAL EVERY 4 HOURS PRN
Status: DISCONTINUED | OUTPATIENT
Start: 2019-02-20 | End: 2019-02-21 | Stop reason: HOSPADM

## 2019-02-20 RX ADMIN — OXYCODONE AND ACETAMINOPHEN 2 TABLET: 5; 325 TABLET ORAL at 09:18

## 2019-02-20 RX ADMIN — POLYETHYLENE GLYCOL 3350 17 G: 17 POWDER, FOR SOLUTION ORAL at 11:33

## 2019-02-20 RX ADMIN — HYDROCODONE BITARTRATE AND ACETAMINOPHEN 2 TABLET: 7.5; 325 TABLET ORAL at 17:29

## 2019-02-20 RX ADMIN — METAXALONE 800 MG: 800 TABLET ORAL at 21:16

## 2019-02-20 RX ADMIN — SODIUM CHLORIDE, PRESERVATIVE FREE 3 ML: 5 INJECTION INTRAVENOUS at 11:34

## 2019-02-20 RX ADMIN — LISINOPRIL 20 MG: 20 TABLET ORAL at 21:16

## 2019-02-20 RX ADMIN — TEMAZEPAM 15 MG: 15 CAPSULE ORAL at 21:16

## 2019-02-20 RX ADMIN — METAXALONE 800 MG: 800 TABLET ORAL at 16:18

## 2019-02-20 RX ADMIN — HYDROCODONE BITARTRATE AND ACETAMINOPHEN 2 TABLET: 7.5; 325 TABLET ORAL at 21:16

## 2019-02-20 RX ADMIN — HYDROCODONE BITARTRATE AND ACETAMINOPHEN 2 TABLET: 7.5; 325 TABLET ORAL at 13:30

## 2019-02-20 RX ADMIN — OXYCODONE AND ACETAMINOPHEN 2 TABLET: 5; 325 TABLET ORAL at 04:38

## 2019-02-20 RX ADMIN — SENNOSIDES AND DOCUSATE SODIUM 1 TABLET: 8.6; 5 TABLET ORAL at 21:16

## 2019-02-20 RX ADMIN — LEVOTHYROXINE SODIUM 175 MCG: 175 TABLET ORAL at 06:27

## 2019-02-20 RX ADMIN — SENNOSIDES AND DOCUSATE SODIUM 1 TABLET: 8.6; 5 TABLET ORAL at 11:33

## 2019-02-20 RX ADMIN — METAXALONE 800 MG: 800 TABLET ORAL at 11:33

## 2019-02-20 NOTE — PLAN OF CARE
Problem: Patient Care Overview  Goal: Plan of Care Review  Outcome: Ongoing (interventions implemented as appropriate)   02/20/19 9178   Plan of Care Review   Progress improving   OTHER   Outcome Summary A+Ox4. Ambulating well w/ 1 assist and walker. Wearing brace when OOB. Pain under better control w/ Norco vs. Percocet. Plan to D/C Home tomorrow. CCP working on getting 3-in-1 for patient.    Coping/Psychosocial   Plan of Care Reviewed With patient

## 2019-02-20 NOTE — PROGRESS NOTES
Orthopedic Progress Note      Patient: Royer Arceo    YOB: 1960    Medical Record Number: [id number]    Attending Physician: Jase Stark MD    Date of Admission: 2/18/2019  5:04 AM    Admitting Dx:  Retrolisthesis [M43.10]  Spinal stenosis of lumbar region with neurogenic claudication [M48.062]  Retrolisthesis [M43.10]    Status Post: Right L3-4, L5-S1 laminectomy and L5-S1 fusion with instrumentation    Post Operative Day Number: 2    Current Problem List:   Patient Active Problem List   Diagnosis   • HLD (hyperlipidemia)   • Osteoarthritis of multiple joints   • Adult hypothyroidism   • Borderline glaucoma   • Postoperative hypothyroidism   • Iron deficiency anemia due to chronic blood loss   • Occult blood in stools   • Retrolisthesis   • Spinal stenosis of lumbar region with neurogenic claudication         Past Medical History:   Diagnosis Date   • Arthritis    • Glaucoma    • History of anemia    • Hyperlipidemia    • Hypertension    • Hypothyroidism    • PONV (postoperative nausea and vomiting)    • Spinal stenosis        SUBJECTIVE: 58 y.o.  male.  Awake and alert.  More pain today.    OBJECTIVE:   Vitals:    02/19/19 1731 02/19/19 2031 02/20/19 0517 02/20/19 0911   BP: 112/67 110/64 116/66 108/63   BP Location: Right arm Right arm Right arm Right arm   Patient Position: Lying Lying Lying Lying   Pulse: 80 82 81 74   Resp: 18 18 18 18   Temp: 98.2 °F (36.8 °C) 99.1 °F (37.3 °C) 99.8 °F (37.7 °C) 99.1 °F (37.3 °C)   TempSrc: Oral Oral Oral Oral   SpO2: 98% 95% 95% 97%   Weight:       Height:         I/O last 3 completed shifts:  In: 2000 [I.V.:2000]  Out: 2350 [Urine:2350]    Current Medications:  Scheduled Meds:  dorzolamide 1 drop Right Eye Nightly   levothyroxine 175 mcg Oral QAM AC   lisinopril 20 mg Oral Nightly   metaxalone 800 mg Oral TID   polyethylene glycol 17 g Oral Daily   sennosides-docusate sodium 1 tablet Oral BID   sodium chloride 3 mL Intravenous Q12H     PRN  "Meds:.•  bisacodyl  •  HYDROcodone-acetaminophen **OR** HYDROcodone-acetaminophen  •  naloxone  •  ondansetron **OR** ondansetron ODT **OR** ondansetron  •  sodium chloride  •  temazepam    Diagnostic Tests:   Lab Results (last 24 hours)     Procedure Component Value Units Date/Time    Hemoglobin & Hematocrit, Blood [[id number]]  (Abnormal) Collected:  02/20/19 0528    Specimen:  Blood Updated:  02/20/19 0626     Hemoglobin 10.0 g/dL      Hematocrit 31.6 %     Tissue Pathology Exam [[id number]] Collected:  02/18/19 0936    Specimen:  Tissue from Spine, Lumbar Updated:  02/19/19 1223     Case Report --     Surgical Pathology Report                         Case: [id number]                                  Authorizing Provider:  Jase Stark MD       Collected:           02/18/2019 09:36 AM          Ordering Location:     [facility]  Received:            02/18/2019 12:09 PM                                 MAIN OR                                                                      Pathologist:           Carlos Ward MD                                                         Specimen:    Spine, Lumbar, SYNOVIAL CYST                                                                Final Diagnosis --     1.  Soft Tissue, Lumbar Spine, Synovial Cyst:  Benign bone fibrous tissue and cartilaginous like tissue consistent with the clinical history of synovial cyst.      charles/bb        Gross Description --     1. Received in formalin labeled \"synovial cyst\" is a 1.5 x 1.5 x 0.5 cm aggregate of tan pink rubbery tissue fragments which is submitted en toto as 1A.  jap/uso/charles/pkelle        Microscopic Description --     Microscopic performed, incorporated in diagnosis.             PHYSICAL EXAM: Back dressing is dry and intact.  Patient is moving legs well with good strength.  He is complaining of some right buttock pain, but denies any radicular symptoms.  Also complaining that the Percocet keeps him awake.  Will " switch him to hydrocodone.  Tissue pathology does show what is consistent with a synovial cyst.  Voiding well.  Abdomen is soft but slightly distended with good bowel sounds.  He is passing gas.  No BM    ASSESSMENT & PLAN:  Plan home later today possibly tomorrow if pain is improved.  Was not able to participate much with therapy today because of the pain.  But have recommended that he continue PT this afternoon.        Date: 2/20/2019    Tammy Handley RN    Agree, Jase Stark MD

## 2019-02-20 NOTE — THERAPY TREATMENT NOTE
Acute Care - Physical Therapy Treatment Note  [facility]     Patient Name: Royer Arceo  : 1960  MRN: [id number]  Today's Date: 2019  Onset of Illness/Injury or Date of Surgery: 19  Date of Referral to PT: 19  Referring Physician: Lincoln    Admit Date: 2019    Visit Dx:    ICD-10-CM ICD-9-CM   1. Impaired mobility Z74.09 799.89   2. Retrolisthesis M43.10 733.90   3. Spinal stenosis of lumbar region with neurogenic claudication M48.062 724.03     Patient Active Problem List   Diagnosis   • HLD (hyperlipidemia)   • Osteoarthritis of multiple joints   • Adult hypothyroidism   • Borderline glaucoma   • Postoperative hypothyroidism   • Iron deficiency anemia due to chronic blood loss   • Occult blood in stools   • Retrolisthesis   • Spinal stenosis of lumbar region with neurogenic claudication       Therapy Treatment    Rehabilitation Treatment Summary     Row Name 19 08             Treatment Time/Intention    Discipline  physical therapy assistant  -CW      Document Type  therapy note (daily note)  -CW      Subjective Information  complains of;pain;fatigue  -CW      Mode of Treatment  individual therapy;physical therapy  -CW      Therapy Frequency (PT Clinical Impression)  2 times/day  -CW      Patient Effort  good  -CW      Existing Precautions/Restrictions  fall;brace worn when out of bed;spinal  -CW      Recorded by [CW] Vinicio Garduno, PTA 19 0845      Row Name 19 08             Vital Signs    O2 Delivery Pre Treatment  room air  -CW      Recorded by [CW] Vinicio Garduno, PTA 19 0845      Row Name 19 08             Cognitive Assessment/Intervention- PT/OT    Orientation Status (Cognition)  oriented x 4  -CW      Follows Commands (Cognition)  WFL  -CW      Personal Safety Interventions  fall prevention program maintained;gait belt;muscle strengthening facilitated;nonskid shoes/slippers when out of bed  -CW      Recorded by [CW] Shaan  Vinicio NÚÑEZ, PTA 02/20/19 0845      Row Name 02/20/19 0800             Bed Mobility Assessment/Treatment    Bed Mobility Assessment/Treatment  supine-sit;sit-supine  -CW      Supine-Sit [place] (Bed Mobility)  supervision  -CW      Sit-Supine [place] (Bed Mobility)  supervision  -CW      Assistive Device (Bed Mobility)  bed rails  -CW      Recorded by [CW] Vinicio Garduno, PTA 02/20/19 0845      Row Name 02/20/19 0800             Transfer Assessment/Treatment    Transfer Assessment/Treatment  sit-stand transfer;stand-sit transfer  -CW      Recorded by [CW] Vinicio Garduno, PTA 02/20/19 0845      Row Name 02/20/19 0800             Sit-Stand Transfer    Sit-Stand [place] (Transfers)  contact guard;verbal cues  -CW      Assistive Device (Sit-Stand Transfers)  walker, front-wheeled  -CW      Recorded by [CW] Vinicio Garduno, PTA 02/20/19 0845      Row Name 02/20/19 0800             Stand-Sit Transfer    Stand-Sit [place] (Transfers)  contact guard;verbal cues  -CW      Assistive Device (Stand-Sit Transfers)  walker, front-wheeled  -CW      Recorded by [CW] Vinicio Garduno, PTA 02/20/19 0845      Row Name 02/20/19 0800             Gait/Stairs Assessment/Training    [place] Level (Gait)  contact guard;verbal cues  -CW      Assistive Device (Gait)  walker, front-wheeled  -CW      Distance in Feet (Gait)  200  -CW      Pattern (Gait)  swing-through  -CW      Deviations/Abnormal Patterns (Gait)  base of support, wide;barbara decreased  -CW      Recorded by [CW] Vinicio Garduno, PTA 02/20/19 0845      Row Name 02/20/19 0800             Positioning and Restraints    Pre-Treatment Position  in bed  -CW      Post Treatment Position  bed  -CW      In Bed  notified nsg;supine;call light within reach;encouraged to call for assist  -CW      Recorded by [CW] Vinicio Garduno, PTA 02/20/19 0845      Row Name 02/20/19 0800             Pain Assessment    Additional Documentation  Pain Scale:  Numbers Pre/Post-Treatment (Group)  -CW      Recorded by [CW] Vinicio Garduno, PTA 02/20/19 0845      Row Name 02/20/19 0800             Pain Scale: Numbers Pre/Post-Treatment    Pain Scale: Numbers, Pretreatment  3/10  -CW      Pain Scale: Numbers, Post-Treatment  6/10  -CW      Pain Location - Orientation  --  -CW      Pain Location  back  -CW      Pain Intervention(s)  Repositioned;Ambulation/increased activity  -CW      Recorded by [CW] Vinicio Garduno, PTA 02/20/19 0845      Row Name                Wound 02/18/19 0844 Other (See comments) back incision    Wound - Properties Group Date first assessed: 02/18/19 [JT] Time first assessed: 0844 [JT] Side: Other (See comments) [JT] Location: back [JT] Type: incision [JT] Recorded by:  [JT] Laura Hector RN 02/18/19 0844    Row Name 02/20/19 0800             Outcome Summary/Treatment Plan (PT)    Anticipated Discharge Disposition (PT)  home with home health  -CW      Recorded by [CW] Vinicio Garduno, PTA 02/20/19 0845        User Key  (r) = Recorded By, (t) = Taken By, (c) = Cosigned By    Initials Name Effective Dates Discipline    JT Laura Hector RN 06/16/16 -  Nurse    CW Vinicio Garduno, PTA 03/07/18 -  PT          Wound 02/18/19 0844 Other (See comments) back incision (Active)   Dressing Appearance dry;intact;no drainage 2/20/2019 12:02 AM   Closure [place] 2/20/2019 12:02 AM   Base dressing in place, unable to visualize 2/20/2019 12:02 AM   Drainage Amount none 2/20/2019 12:02 AM   Dressing Care, Wound foam 2/19/2019  8:05 PM           Physical Therapy Education     Title: PT OT SLP Therapies (Done)     Topic: Physical Therapy (Done)     Point: Mobility training (Done)     Learning Progress Summary           Patient Acceptance, E,TB, VU,DU by CW at 2/20/2019  8:45 AM    Acceptance, E, VU,NR by  at 2/19/2019 10:53 AM                   Point: Home exercise program (Done)     Learning Progress Summary           Patient Acceptance, E,TB,  VU,DU by  at 2/20/2019  8:45 AM    Acceptance, E, VU,NR by  at 2/19/2019 10:53 AM                   Point: Body mechanics (Done)     Learning Progress Summary           Patient Acceptance, E,TB, VU,DU by  at 2/20/2019  8:45 AM    Acceptance, E, VU,NR by  at 2/19/2019 10:53 AM                   Point: Precautions (Done)     Learning Progress Summary           Patient Acceptance, E,TB, VU,DU by  at 2/20/2019  8:45 AM    Acceptance, E, VU,NR by  at 2/19/2019 10:53 AM                               User Key     Initials Effective Dates Name Provider Type Discipline     04/03/18 -  Selena Sorto, PT Physical Therapist PT     03/07/18 -  Vinicio Garduno, PTA Physical Therapy Assistant PT                PT Recommendation and Plan  Anticipated Discharge Disposition (PT): home with home health  Therapy Frequency (PT Clinical Impression): 2 times/day  Outcome Summary/Treatment Plan (PT)  Anticipated Discharge Disposition (PT): home with home health  Plan of Care Reviewed With: patient  Progress: improving  Outcome Summary: Pt increasing with bed mobility and transfer safety.  Pt limited amb due to pain int he back and hips and knees pt usually takes meds for arthritis  Outcome Measures     Row Name 02/20/19 0800 02/19/19 1000          How much help from another person do you currently need...    Turning from your back to your side while in flat bed without using bedrails?  4  -CW  4  -LH     Moving from lying on back to sitting on the side of a flat bed without bedrails?  4  -CW  4  -LH     Moving to and from a bed to a chair (including a wheelchair)?  4  -CW  4  -LH     Standing up from a chair using your arms (e.g., wheelchair, bedside chair)?  3  -CW  3  -LH     Climbing 3-5 steps with a railing?  3  -CW  3  -LH     To walk in hospital room?  3  -CW  3  -LH     AM-PAC 6 Clicks Score  21  -  21  -        Functional Assessment    Outcome Measure Options  AM-PAC 6 Clicks Basic Mobility (PT)  -  AM-PAC  6 Clicks Basic Mobility (PT)  -       User Key  (r) = Recorded By, (t) = Taken By, (c) = Cosigned By    Initials Name Provider Type     Selena Sorto, PT Physical Therapist    CW Vinicio Garduno PTA Physical Therapy Assistant         Time Calculation:   PT Charges     Row Name 02/20/19 0847             Time Calculation    Start Time  0829  -CW      Stop Time  0847  -CW      Time Calculation (min)  18 min  -CW      PT Received On  02/20/19  -CW      PT - Next Appointment  02/20/19  -CW        User Key  (r) = Recorded By, (t) = Taken By, (c) = Cosigned By    Initials Name Provider Type    Vinicio Garces PTA Physical Therapy Assistant        Therapy Suggested Charges     Code   Minutes Charges    None           Therapy Charges for Today     Code Description Service Date Service Provider Modifiers Qty    99351574106 HC PT THER PROC EA 15 MIN 2/19/2019 Vinicio Garduno PTA GP 1    09735704566 HC PT THER PROC EA 15 MIN 2/20/2019 Vinicio Garduno PTA GP 1          PT G-Codes  Outcome Measure Options: AM-PAC 6 Clicks Basic Mobility (PT)  AM-PAC 6 Clicks Score: 21    Vinicio Garduno PTA  2/20/2019

## 2019-02-20 NOTE — PLAN OF CARE
Problem: Patient Care Overview  Goal: Plan of Care Review  Outcome: Ongoing (interventions implemented as appropriate)   02/20/19 0106   Plan of Care Review   Progress improving   OTHER   Outcome Summary Pt increasing with bed mobility and transfer safety. Pt limited amb due to pain int he back and hips and knees pt usually takes meds for arthritis   Coping/Psychosocial   Plan of Care Reviewed With patient

## 2019-02-20 NOTE — THERAPY TREATMENT NOTE
Acute Care - Physical Therapy Treatment Note  [facility]     Patient Name: Royer Arceo  : 1960  MRN: [id number]  Today's Date: 2019  Onset of Illness/Injury or Date of Surgery: 19  Date of Referral to PT: 19  Referring Physician: Lincoln    Admit Date: 2019    Visit Dx:    ICD-10-CM ICD-9-CM   1. Impaired mobility Z74.09 799.89   2. Retrolisthesis M43.10 733.90   3. Spinal stenosis of lumbar region with neurogenic claudication M48.062 724.03     Patient Active Problem List   Diagnosis   • HLD (hyperlipidemia)   • Osteoarthritis of multiple joints   • Adult hypothyroidism   • Borderline glaucoma   • Postoperative hypothyroidism   • Iron deficiency anemia due to chronic blood loss   • Occult blood in stools   • Retrolisthesis   • Spinal stenosis of lumbar region with neurogenic claudication       Therapy Treatment    Rehabilitation Treatment Summary     Row Name 19 1500 19 0800          Treatment Time/Intention    Discipline  physical therapy assistant  -CW  physical therapy assistant  -CW     Document Type  therapy note (daily note)  -CW  therapy note (daily note)  -CW     Subjective Information  complains of;pain  -CW  complains of;pain;fatigue  -CW     Mode of Treatment  individual therapy;physical therapy  -CW  individual therapy;physical therapy  -CW     Therapy Frequency (PT Clinical Impression)  2 times/day  -CW  2 times/day  -CW     Patient Effort  good  -CW  good  -CW     Existing Precautions/Restrictions  fall;brace worn when out of bed;spinal  -CW  fall;brace worn when out of bed;spinal  -CW     Recorded by [CW] Vinicio Garduno, PTA 19 153 [CW] Vinicio Garduno, PTA 19 0845     Row Name 19 1500 19 0800          Vital Signs    O2 Delivery Pre Treatment  room air  -CW  room air  -CW     Recorded by [CW] Vinicio Garduno, PTA 19 153 [CW] Vinicio Garduno, PTA 19 0845     Row Name 19 1500 19 0800           Cognitive Assessment/Intervention- PT/OT    Orientation Status (Cognition)  oriented x 4  -CW  oriented x 4  -CW     Follows Commands (Cognition)  WFL  -CW  WFL  -CW     Personal Safety Interventions  fall prevention program maintained;gait belt;muscle strengthening facilitated;nonskid shoes/slippers when out of bed  -CW  fall prevention program maintained;gait belt;muscle strengthening facilitated;nonskid shoes/slippers when out of bed  -CW     Recorded by [CW] Vinicio Garduno, PTA 02/20/19 1539 [CW] Vinicio Garduno, PTA 02/20/19 0845     Row Name 02/20/19 1500 02/20/19 0800          Bed Mobility Assessment/Treatment    Bed Mobility Assessment/Treatment  supine-sit;sit-supine  -CW  supine-sit;sit-supine  -CW     Supine-Sit Wheeler (Bed Mobility)  supervision  -CW  supervision  -CW     Sit-Supine Wheeler (Bed Mobility)  supervision  -CW  supervision  -CW     Assistive Device (Bed Mobility)  bed rails  -CW  bed rails  -CW     Recorded by [CW] Vinicio Garduno, PTA 02/20/19 1539 [CW] Vinicio Garduno, PTA 02/20/19 0845     Row Name 02/20/19 1500 02/20/19 0800          Transfer Assessment/Treatment    Transfer Assessment/Treatment  sit-stand transfer;stand-sit transfer  -CW  sit-stand transfer;stand-sit transfer  -CW     Recorded by [CW] Vinicio Garduno, PTA 02/20/19 1539 [CW] Vinicio Garduno, PTA 02/20/19 0845     Row Name 02/20/19 1500 02/20/19 0800          Sit-Stand Transfer    Sit-Stand Wheeler (Transfers)  supervision  -CW  contact guard;verbal cues  -CW     Assistive Device (Sit-Stand Transfers)  walker, front-wheeled  -CW  walker, front-wheeled  -CW     Recorded by [CW] Vinicio Garduno, PTA 02/20/19 1539 [CW] Vinicio Garduno, PTA 02/20/19 0845     Row Name 02/20/19 1500 02/20/19 0800          Stand-Sit Transfer    Stand-Sit Wheeler (Transfers)  supervision  -CW  contact guard;verbal cues  -CW     Assistive Device (Stand-Sit Transfers)  walker, front-wheeled   -CW  walker, front-wheeled  -CW     Recorded by [CW] Vinicio Garduno, PTA 02/20/19 1539 [CW] Vinicio Garduno, PTA 02/20/19 0845     Row Name 02/20/19 1500 02/20/19 0800          Gait/Stairs Assessment/Training    Woodford Level (Gait)  supervision  -CW  contact guard;verbal cues  -CW     Assistive Device (Gait)  walker, front-wheeled  -CW  walker, front-wheeled  -CW     Distance in Feet (Gait)  200  -CW  200  -CW     Pattern (Gait)  swing-through  -CW  swing-through  -CW     Deviations/Abnormal Patterns (Gait)  base of support, wide;barbara decreased  -CW  base of support, wide;barbara decreased  -CW     Woodford Level (Stairs)  supervision  -CW  --     Handrail Location (Stairs)  left side (ascending)  -CW  --     Number of Steps (Stairs)  6  -CW  --     Ascending Technique (Stairs)  step-to-step  -CW  --     Descending Technique (Stairs)  step-to-step  -CW  --     Recorded by [CW] Vinicio Garduno, PTA 02/20/19 1539 [CW] Vinicio Garduno, PTA 02/20/19 0845     Row Name 02/20/19 1500 02/20/19 0800          Positioning and Restraints    Pre-Treatment Position  in bed  -CW  in bed  -CW     Post Treatment Position  bed  -CW  bed  -CW     In Bed  notified nsg;supine;call light within reach;encouraged to call for assist;with family/caregiver  -CW  notified nsg;supine;call light within reach;encouraged to call for assist  -CW     Recorded by [CW] Vinicio Garduno, PTA 02/20/19 1539 [CW] Vinicio Garduno, PTA 02/20/19 0845     Row Name 02/20/19 1500 02/20/19 0800          Pain Assessment    Additional Documentation  Pain Scale: Numbers Pre/Post-Treatment (Group)  -CW  Pain Scale: Numbers Pre/Post-Treatment (Group)  -CW     Recorded by [CW] Vinicio Garduno, PTA 02/20/19 1539 [CW] Vinicio Garduno, PTA 02/20/19 0845     Row Name 02/20/19 1500 02/20/19 0800          Pain Scale: Numbers Pre/Post-Treatment    Pain Scale: Numbers, Pretreatment  3/10  -CW  3/10  -CW     Pain Scale: Numbers,  Post-Treatment  6/10  -CW  6/10  -CW     Pain Location - Orientation  --  --  -CW     Pain Location  back  -CW  back  -CW     Pain Intervention(s)  --  Repositioned;Ambulation/increased activity  -CW     Recorded by [CW] Vinicio Garduno, PTA 02/20/19 1539 [CW] Vinicio Garduno, PTA 02/20/19 0845     Row Name                Wound 02/18/19 0844 Other (See comments) back incision    Wound - Properties Group Date first assessed: 02/18/19 [JT] Time first assessed: 0844 [JT] Side: Other (See comments) [JT] Location: back [JT] Type: incision [JT] Recorded by:  [JT] Laura Hector RN 02/18/19 0844    Row Name 02/20/19 1500 02/20/19 0800          Outcome Summary/Treatment Plan (PT)    Anticipated Discharge Disposition (PT)  home with home health  -CW  home with home health  -CW     Recorded by [CW] Vinicio Garduno, PTA 02/20/19 1539 [CW] Vinicio Garduno, PTA 02/20/19 0845       User Key  (r) = Recorded By, (t) = Taken By, (c) = Cosigned By    Initials Name Effective Dates Discipline    JT Laura Hector RN 06/16/16 -  Nurse    CW Vinicio Garduno, PTA 03/07/18 -  PT          Wound 02/18/19 0844 Other (See comments) back incision (Active)   Dressing Appearance dry;intact;no drainage 2/20/2019 11:40 AM   Closure ALLYSON 2/20/2019 11:40 AM   Base dressing in place, unable to visualize 2/20/2019 11:40 AM   Drainage Amount none 2/20/2019 11:40 AM   Dressing Care, Wound foam 2/20/2019 11:40 AM           Physical Therapy Education     Title: PT OT SLP Therapies (Done)     Topic: Physical Therapy (Done)     Point: Mobility training (Done)     Learning Progress Summary           Patient Acceptance, E,TB, VU,DU by DUY at 2/20/2019  8:45 AM    Acceptance, E, VU,NR by  at 2/19/2019 10:53 AM                   Point: Home exercise program (Done)     Learning Progress Summary           Patient Acceptance, E,TB, VU,DU by DUY at 2/20/2019  8:45 AM    Acceptance, LETICIA SORENSEN,NR by  at 2/19/2019 10:53 AM                    Point: Body mechanics (Done)     Learning Progress Summary           Patient Acceptance, E,TB, VU,DU by  at 2/20/2019  8:45 AM    Acceptance, E, VU,NR by  at 2/19/2019 10:53 AM                   Point: Precautions (Done)     Learning Progress Summary           Patient Acceptance, E,TB, VU,DU by  at 2/20/2019  8:45 AM    Acceptance, E, VU,NR by  at 2/19/2019 10:53 AM                               User Key     Initials Effective Dates Name Provider Type Discipline     04/03/18 -  Selena Sorto, PT Physical Therapist PT     03/07/18 -  Vinicio Garduno, PTA Physical Therapy Assistant PT                PT Recommendation and Plan  Anticipated Discharge Disposition (PT): home with home health  Therapy Frequency (PT Clinical Impression): 2 times/day  Outcome Summary/Treatment Plan (PT)  Anticipated Discharge Disposition (PT): home with home health  Plan of Care Reviewed With: patient  Progress: improving  Outcome Summary: Pt increasing with bed mobility and transfer safety.  Pt limited amb due to pain int he back and hips and knees pt usually takes meds for arthritis  Outcome Measures     Row Name 02/20/19 0800 02/19/19 1000          How much help from another person do you currently need...    Turning from your back to your side while in flat bed without using bedrails?  4  -CW  4  -LH     Moving from lying on back to sitting on the side of a flat bed without bedrails?  4  -CW  4  -LH     Moving to and from a bed to a chair (including a wheelchair)?  4  -CW  4  -LH     Standing up from a chair using your arms (e.g., wheelchair, bedside chair)?  3  -CW  3  -LH     Climbing 3-5 steps with a railing?  3  -CW  3  -LH     To walk in hospital room?  3  -CW  3  -LH     AM-PAC 6 Clicks Score  21  -  21  -        Functional Assessment    Outcome Measure Options  AM-PAC 6 Clicks Basic Mobility (PT)  -  AM-PAC 6 Clicks Basic Mobility (PT)  -       User Key  (r) = Recorded By, (t) = Taken By, (c) = Cosigned By     Initials Name Provider Type     Selena Sorto, PT Physical Therapist    CW Vinicio Garduno, PTA Physical Therapy Assistant         Time Calculation:   PT Charges     Row Name 02/20/19 1539 02/20/19 0847          Time Calculation    Start Time  1522  -CW  0829  -CW     Stop Time  1539  -CW  0847  -CW     Time Calculation (min)  17 min  -CW  18 min  -CW     PT Received On  02/20/19  -CW  02/20/19  -CW     PT - Next Appointment  02/21/19  -CW  02/20/19  -CW       User Key  (r) = Recorded By, (t) = Taken By, (c) = Cosigned By    Initials Name Provider Type    CW Vinicio Garduno, PTA Physical Therapy Assistant        Therapy Suggested Charges     Code   Minutes Charges    None           Therapy Charges for Today     Code Description Service Date Service Provider Modifiers Qty    07952952282 HC PT THER PROC EA 15 MIN 2/19/2019 Vinicio Garduno, PTA GP 1    51507373940 HC PT THER PROC EA 15 MIN 2/20/2019 Vinicio Garduno, YAIR GP 1    29064376496 HC PT THER PROC EA 15 MIN 2/20/2019 Vinicio Garduno, YAIR GP 1          PT G-Codes  Outcome Measure Options: AM-PAC 6 Clicks Basic Mobility (PT)  AM-PAC 6 Clicks Score: 21    Vinicio Garduno PTA  2/20/2019

## 2019-02-21 VITALS
WEIGHT: 264 LBS | BODY MASS INDEX: 34.99 KG/M2 | HEIGHT: 73 IN | RESPIRATION RATE: 18 BRPM | SYSTOLIC BLOOD PRESSURE: 132 MMHG | TEMPERATURE: 98.8 F | DIASTOLIC BLOOD PRESSURE: 83 MMHG | HEART RATE: 72 BPM | OXYGEN SATURATION: 95 %

## 2019-02-21 PROCEDURE — 94799 UNLISTED PULMONARY SVC/PX: CPT

## 2019-02-21 RX ORDER — ONDANSETRON 4 MG/1
4 TABLET, FILM COATED ORAL EVERY 6 HOURS PRN
Qty: 15 TABLET | Refills: 0 | Status: SHIPPED | OUTPATIENT
Start: 2019-02-21 | End: 2019-04-16

## 2019-02-21 RX ORDER — BISACODYL 5 MG/1
10 TABLET, DELAYED RELEASE ORAL ONCE
Status: COMPLETED | OUTPATIENT
Start: 2019-02-21 | End: 2019-02-21

## 2019-02-21 RX ORDER — HYDROCODONE BITARTRATE AND ACETAMINOPHEN 7.5; 325 MG/1; MG/1
1-2 TABLET ORAL EVERY 4 HOURS PRN
Qty: 50 TABLET | Refills: 0
Start: 2019-02-21 | End: 2019-03-05 | Stop reason: SDUPTHER

## 2019-02-21 RX ADMIN — LEVOTHYROXINE SODIUM 175 MCG: 175 TABLET ORAL at 05:46

## 2019-02-21 RX ADMIN — ONDANSETRON 4 MG: 4 TABLET, ORALLY DISINTEGRATING ORAL at 11:32

## 2019-02-21 RX ADMIN — HYDROCODONE BITARTRATE AND ACETAMINOPHEN 2 TABLET: 7.5; 325 TABLET ORAL at 05:46

## 2019-02-21 RX ADMIN — HYDROCODONE BITARTRATE AND ACETAMINOPHEN 2 TABLET: 7.5; 325 TABLET ORAL at 01:16

## 2019-02-21 RX ADMIN — HYDROCODONE BITARTRATE AND ACETAMINOPHEN 2 TABLET: 7.5; 325 TABLET ORAL at 10:33

## 2019-02-21 RX ADMIN — BISACODYL 10 MG: 5 TABLET, COATED ORAL at 10:33

## 2019-02-21 RX ADMIN — BISACODYL 10 MG: 10 SUPPOSITORY RECTAL at 13:24

## 2019-02-21 RX ADMIN — METAXALONE 800 MG: 800 TABLET ORAL at 10:33

## 2019-02-21 RX ADMIN — SENNOSIDES AND DOCUSATE SODIUM 1 TABLET: 8.6; 5 TABLET ORAL at 10:33

## 2019-02-21 RX ADMIN — POLYETHYLENE GLYCOL 3350 17 G: 17 POWDER, FOR SOLUTION ORAL at 10:32

## 2019-02-21 NOTE — PAYOR COMM NOTE
"DISCHARGED        Love Dueñas (58 y.o. Male)     Date of Birth Social Security Number Address Home Phone MRN    1960  [address] [id number] [phone] [id number]    S[employer]ianism Marital Status          Unknown        Admission Date Admission Type Admitting Provider Attending Provider Department, Room/Bed    2/18/19 Elective Jase Stark MD  [address], P583/1    Discharge Date Discharge Disposition Discharge Destination        2/21/2019 Home or Self Care              Attending Provider:  (none)   Allergies:  Promethazine, Penicillins    Isolation:  None   Infection:  None   Code Status:  CPR    Ht:  185.4 cm (72.99\")   Wt:  120 kg (264 lb)    Admission Cmt:  None   Principal Problem:  None                Active Insurance as of 2/18/2019     Primary Coverage     Payor Plan Insurance Group Employer/Plan Group    [employer]CIAL AETNA [id number]     Payor Plan Address Payor Plan Phone Number Payor Plan Fax Number Effective Dates    [address] [phone]  1/1/2016 - None Entered    [facility] [place] [address]       Subscriber Name Subscriber Birth Date Member ID       LOVE DUEÑAS 1960 [id number]                 Emergency Contacts      (Rel.) Home Phone Work Phone Mobile Phone    Michelle Dueñas (Spouse) -- -- [phone]    Jd Gutierrez (Daughter) [phone] -- --    Rula Dueñas (Daughter) [phone] -- --        "

## 2019-02-21 NOTE — PROGRESS NOTES
Case Management Discharge Note    Final Note: Home w/ spouse    Destination      No service has been selected for the patient.      Durable Medical Equipment      No service has been selected for the patient.      Dialysis/Infusion      No service has been selected for the patient.      Home Medical Care      Confirmed with Tammy FINLEY with Dr Stark no HH needs.  Yovany Tripathi RN      Community Resources      No service has been selected for the patient.        Other: Other    Final Discharge Disposition Code: 01 - home or self-care

## 2019-02-21 NOTE — DISCHARGE SUMMARY
Orthopedic Discharge Summary      Patient: Royer Arceo  YOB: 1960  Medical Record Number: [id number]    Attending Physician: Jase Stark MD  Consulting Physician(s):   Consults     No orders found from 1/20/2019 to 2/19/2019.          Date of Admission: 2/18/2019  5:04 AM  Date of Discharge:2/21/2019    Discharge Diagnosis: Right L3-4, L5-S1 laminectomy and L5-S1 fusion with instrumentation,   Acute Blood Loss Anemia  PO Constipation      Presenting Problem/History of Present Illness: Retrolisthesis [M43.10]  Spinal stenosis of lumbar region with neurogenic claudication [M48.062]  Retrolisthesis [M43.10]      Allergies:   Allergies   Allergen Reactions   • Promethazine Other (See Comments)     BLISTERS ON TOUNGE   • Penicillins Rash       Discharge Medications     Discharge Medications      New Medications      Instructions Start Date   HYDROcodone-acetaminophen 7.5-325 MG per tablet  Commonly known as:  NORCO   Take 1-2 tabs po q 4 hours prn pain      ondansetron 4 MG tablet  Commonly known as:  ZOFRAN   4 mg, Oral, Every 6 Hours PRN      sennosides-docusate sodium 8.6-50 MG tablet  Commonly known as:  SENOKOT-S   1 tablet, Oral, 2 Times Daily         Continue These Medications      Instructions Start Date   dorzolamide 2 % ophthalmic solution  Commonly known as:  TRUSOPT   1 drop, Right Eye, Nightly      levothyroxine 175 MCG tablet  Commonly known as:  SYNTHROID, LEVOTHROID   175 mcg, Oral, Every Morning Before Breakfast, PT STATES HE CAN ONLY TAKE THE GENERIC FORM      lisinopril 20 MG tablet  Commonly known as:  PRINIVIL,ZESTRIL   20 mg, Oral, Nightly               Past Medical History:   Diagnosis Date   • Arthritis    • Glaucoma    • History of anemia    • Hyperlipidemia    • Hypertension    • Hypothyroidism    • PONV (postoperative nausea and vomiting)    • Spinal stenosis         Past Surgical History:   Procedure Laterality Date   • ANAL FISTULOTOMY     • COLONOSCOPY  approx 2011     normal per patient   • COLONOSCOPY N/A 7/31/2017    Procedure: COLONOSCOPY TO CECUM AND INTO TERMNAL ILEUM;  Surgeon: Lm Rowe MD;  Location: [facility] ENDOSCOPY;  Service:    • ENDOSCOPY N/A 7/31/2017    Procedure: ESOPHAGOGASTRODUODENOSCOPY WITH COLD BIOPSIES;  Surgeon: Lm Rowe MD;  Location: [facility] ENDOSCOPY;  Service:    • EYE SURGERY      clear lens exchange bilateral   • LUMBAR DISCECTOMY FUSION INSTRUMENTATION N/A 2/18/2019    Procedure: Right L3-4, L5-S1 laminectomy and L5-S1 fusion with instrumentation;  Surgeon: Jase Stark MD;  Location: [facility] MAIN OR;  Service: Orthopedic Spine   • THYROIDECTOMY     • TONSILLECTOMY     • WISDOM TOOTH EXTRACTION          Social History     Occupational History   • Not on file   Tobacco Use   • Smoking status: Never Smoker   • Smokeless tobacco: Never Used   Substance and Sexual Activity   • Alcohol use: Yes     Comment: very occasional    • Drug use: No   • Sexual activity: Defer      Social History     Social History Narrative   • Not on file        Family History   Problem Relation Age of Onset   • Parkinsonism Father    • Diabetes Father    • Dementia Father    • Heart disease Maternal Aunt    • Stroke Paternal Grandfather    • Heart disease Maternal Aunt    • Malig Hyperthermia Neg Hx          Physical Exam: 58 y.o. male  General Appearance:    Alert, cooperative, in no acute distress                      Vitals:    02/20/19 2054 02/20/19 2116 02/21/19 0403 02/21/19 0804   BP: 119/67  118/68 126/86   BP Location: Right arm  Right arm Right arm   Patient Position: Lying  Lying Lying   Pulse:  72  62   Resp: 18  18    Temp: 99.4 °F (37.4 °C)  98 °F (36.7 °C) 97.6 °F (36.4 °C)   TempSrc: Oral  Oral Oral   SpO2:    94%   Weight:       Height:            DIAGNOSTIC TESTS:   Admission on 02/18/2019   Component Date Value Ref Range Status   • ABO Type 02/18/2019 O   Final   • RH type 02/18/2019 Positive   Final   • Antibody Screen 02/18/2019  Negative   Final   • T&S Expiration Date 02/18/2019 2/21/2019 11:59:59 PM   Final   • Case Report 02/18/2019    Final                    Value:Surgical Pathology Report                         Case: [id number]                                  Authorizing Provider:  Jase Stark MD       Collected:           02/18/2019 09:36 AM          Ordering Location:     [facility]  Received:            02/18/2019 12:09 PM                                 MAIN OR                                                                      Pathologist:           Carlos Ward MD                                                         Specimen:    Spine, Lumbar, SYNOVIAL CYST                                                              • Final Diagnosis 02/18/2019    Final                    Value:This result contains rich text formatting which cannot be displayed here.   • Gross Description 02/18/2019    Final                    Value:This result contains rich text formatting which cannot be displayed here.   • Microscopic Description 02/18/2019    Final                    Value:This result contains rich text formatting which cannot be displayed here.   • Hemoglobin 02/18/2019 12.8* 13.0 - 17.7 g/dL Final   • Hematocrit 02/18/2019 38.9  37.5 - 51.0 % Final   • Hemoglobin 02/19/2019 11.1* 13.0 - 17.7 g/dL Final   • Hematocrit 02/19/2019 35.0* 37.5 - 51.0 % Final   • Hemoglobin 02/20/2019 10.0* 13.0 - 17.7 g/dL Final   • Hematocrit 02/20/2019 31.6* 37.5 - 51.0 % Final       No results found.    Hospital Course:  58 y.o. male admitted to [facility] to services of Jase Stark MD with Retrolisthesis [M43.10]  Spinal stenosis of lumbar region with neurogenic claudication [M48.062]  Retrolisthesis [M43.10] on 2/18/2019 and underwent Right L3-4, L5-S1 laminectomy and L5-S1 fusion with instrumentation  Per Jase Stark MD. Antibiotic and VTE prophylaxis were per [facility]IP protocols.  The patient was admitted to  the floor where IV and/or oral pain medications were administered for postoperative pain.  Patient complains of constipation.  Will give Dulcolax tab today.  At discharge the incisional pain was tolerable and preop neurologic function was intact.  The dressing was dry and the wound was clean.    Condition on Discharge:  Stable    Discharge Instructions:   1.  Patient may weight bear as tolerated unless otherwise specified.   2.  May use ice to back incision as needed.   3.  Patient also instructed on incentive spirometer during hospitalization and encouraged to continue to use at home regularly.   4.  Dressing is waterproof and should remain on and intact until seen in the office at 1st PO visit.  Patient has been instructed to contact the office if dressing becomes loose or saturated.   5.  Patient may shower on POD #3 if and when all wound drainage has stopped.    6.  Back brace should be worn when up and about.  It need not be worn to the bathroom and certainly not in the shower.   7.  Patient is to avoid forward bending and twisting at the waist.  No lifting greater than 5 pounds.         A detailed list of instructions specific to the operation was given to the patient at the time of discharge.    Follow up Instructions:  Follow up in the office with Dr. Jase Stark Jr. in 2-3 weeks - patient to call the office at [phone] to schedule. Prescriptions were given for pain medication.    Follow-up Appointments  Future Appointments   Date Time Provider Department Center   3/5/2019  9:20 AM Jase Stark MD [facility] [address] None     Additional Instructions for the Follow-ups that You Need to Schedule     Discharge Follow-up with Specialty: Orthopedics; 2 Weeks   As directed      Specialty:  Orthopedics    Follow Up:  2 Weeks    Follow Up Details:  Return to the office to see Dr. Jase Stark               Discharge Disposition Plan:today to home after BM    Date: 2/21/2019    Tammy Handley  RN  02/21/19  8:57 AM    Agree,Jase Stark MD

## 2019-02-21 NOTE — PAYOR COMM NOTE
"UR CONTACT:  AMRIT   P: [phone]         F: [phone]        Love Dueñas (58 y.o. Male)     Date of Birth Social Security Number Address Home Phone MRN    1960  [address] [facility] [address] [phone] [id number]    Gnosticism Marital Status          Unknown        Admission Date Admission Type Admitting Provider Attending Provider Department, Room/Bed    2/18/19 Elective Jase Stark MD Werner, Joseph G, MD [address], P583/1    Discharge Date Discharge Disposition Discharge Destination         Home or Self Care              Attending Provider:  Jase Stark MD    Allergies:  Promethazine, Penicillins    Isolation:  None   Infection:  None   Code Status:  CPR    Ht:  185.4 cm (72.99\")   Wt:  120 kg (264 lb)    Admission Cmt:  None   Principal Problem:  None                Active Insurance as of 2/18/2019     Primary Coverage     Payor Plan Insurance Group Employer/Plan Group    [employer]CIAL A[employer] [id number]     Payor Plan Address Payor Plan Phone Number Payor Plan Fax Number Effective Dates    [address] [phone]  1/1/2016 - None Entered    [facility] [id number]       Subscriber Name Subscriber Birth Date Member ID       LOVE DUEÑAS 1960 [id number]                 Emergency Contacts      (Rel.) Home Phone Work Phone Mobile Phone    Michelle Dueñas (Spouse) -- -- [phone]    FromJd deleon (Daughter) [phone] -- --    Rula Dueñas (Daughter) [phone] -- --            ICU Vital Signs     Row Name 02/21/19 0804 02/21/19 0403 02/20/19 2116 02/20/19 2054 02/20/19 1340       Vitals    Temp  97.6 °F (36.4 °C)  98 °F (36.7 °C)  --  99.4 °F (37.4 °C)  98.3 °F (36.8 °C)    Temp src  Oral  Oral  --  Oral  Oral    Pulse  62  --  72  --  76    Heart Rate Source  Monitor  --  --  --  Monitor    Resp  --  18  --  18  18    Resp Rate Source  --  Visual  --  Visual  Visual    BP  126/86  118/68  --  119/67  " 120/67    Noninvasive MAP (mmHg)  97  --  --  --  --    BP Location  Right arm  Right arm  --  Right arm  Right arm    BP Method  Automatic  Automatic  --  Automatic  Automatic    Patient Position  Lying  Lying  --  Lying  Lying       Oxygen Therapy    SpO2  94 %  --  --  --  98 %    Pulse Oximetry Type  Intermittent  --  --  --  --    Device (Oxygen Therapy)  room air  room air  --  room air  room air     Lines, Drains & Airways    Active LDAs     None                     Physician Progress Notes       Jase Stark MD at 2/20/2019  9:56 AM              Orthopedic Progress Note      Patient: Royer Arceo    YOB: 1960    Medical Record Number: [id number]    Attending Physician: Jase Stark MD    Date of Admission: 2/18/2019  5:04 AM    Admitting Dx:  Retrolisthesis [M43.10]  Spinal stenosis of lumbar region with neurogenic claudication [M48.062]  Retrolisthesis [M43.10]    Status Post: Right L3-4, L5-S1 laminectomy and L5-S1 fusion with instrumentation    Post Operative Day Number: 2    Current Problem List:   Patient Active Problem List   Diagnosis   • HLD (hyperlipidemia)   • Osteoarthritis of multiple joints   • Adult hypothyroidism   • Borderline glaucoma   • Postoperative hypothyroidism   • Iron deficiency anemia due to chronic blood loss   • Occult blood in stools   • Retrolisthesis   • Spinal stenosis of lumbar region with neurogenic claudication         Past Medical History:   Diagnosis Date   • Arthritis    • Glaucoma    • History of anemia    • Hyperlipidemia    • Hypertension    • Hypothyroidism    • PONV (postoperative nausea and vomiting)    • Spinal stenosis        SUBJECTIVE: 58 y.o.  male.  Awake and alert.  More pain today.    OBJECTIVE:   Vitals:    02/19/19 1731 02/19/19 2031 02/20/19 0517 02/20/19 0911   BP: 112/67 110/64 116/66 108/63   BP Location: Right arm Right arm Right arm Right arm   Patient Position: Lying Lying Lying Lying   Pulse: 80 82 81 74   Resp: 18 18 18  18   Temp: 98.2 °F (36.8 °C) 99.1 °F (37.3 °C) 99.8 °F (37.7 °C) 99.1 °F (37.3 °C)   TempSrc: Oral Oral Oral Oral   SpO2: 98% 95% 95% 97%   Weight:       Height:         I/O last 3 completed shifts:  In: 2000 [I.V.:2000]  Out: 2350 [Urine:2350]    Current Medications:  Scheduled Meds:  dorzolamide 1 drop Right Eye Nightly   levothyroxine 175 mcg Oral QAM AC   lisinopril 20 mg Oral Nightly   metaxalone 800 mg Oral TID   polyethylene glycol 17 g Oral Daily   sennosides-docusate sodium 1 tablet Oral BID   sodium chloride 3 mL Intravenous Q12H     PRN Meds:.•  bisacodyl  •  HYDROcodone-acetaminophen **OR** HYDROcodone-acetaminophen  •  naloxone  •  ondansetron **OR** ondansetron ODT **OR** ondansetron  •  sodium chloride  •  temazepam    Diagnostic Tests:   Lab Results (last 24 hours)     Procedure Component Value Units Date/Time    Hemoglobin & Hematocrit, Blood [[id number]]  (Abnormal) Collected:  02/20/19 0528    Specimen:  Blood Updated:  02/20/19 0626     Hemoglobin 10.0 g/dL      Hematocrit 31.6 %     Tissue Pathology Exam [[id number]] Collected:  02/18/19 0936    Specimen:  Tissue from Spine, Lumbar Updated:  02/19/19 1223     Case Report --     Surgical Pathology Report                         Case: [id number]                                  Authorizing Provider:  Jase Stark MD       Collected:           02/18/2019 09:36 AM          Ordering Location:     [facility]  Received:            02/18/2019 12:09 PM                                 MAIN OR                                                                      Pathologist:           Carlos Ward MD                                                         Specimen:    Spine, Lumbar, SYNOVIAL CYST                                                                Final Diagnosis --     1.  Soft Tissue, Lumbar Spine, Synovial Cyst:  Benign bone fibrous tissue and cartilaginous like tissue consistent with the clinical history of synovial  "cyst.      charles/sharifa        Gross Description --     1. Received in formalin labeled \"synovial cyst\" is a 1.5 x 1.5 x 0.5 cm aggregate of tan pink rubbery tissue fragments which is submitted en toto as 1A.  martha/lexi/charles/byron        Microscopic Description --     Microscopic performed, incorporated in diagnosis.             PHYSICAL EXAM: Back dressing is dry and intact.  Patient is moving legs well with good strength.  He is complaining of some right buttock pain, but denies any radicular symptoms.  Also complaining that the Percocet keeps him awake.  Will switch him to hydrocodone.  Tissue pathology does show what is consistent with a synovial cyst.  Voiding well.  Abdomen is soft but slightly distended with good bowel sounds.  He is passing gas.  No BM    ASSESSMENT & PLAN:  Plan home later today possibly tomorrow if pain is improved.  Was not able to participate much with therapy today because of the pain.  But have recommended that he continue PT this afternoon.        Date: 2/20/2019    Tammy Handley RN    Agree, Jase Stark MD            Electronically signed by Jase Stark MD at 2/21/2019  7:48 AM         Janina Vaughan RN   Registered Nurse   Neurology   Plan of Care   Signed   Date of Service:  2/20/2019  5:28 PM               Signed           Problem: Patient Care Overview  Goal: Plan of Care Review  Outcome: Ongoing (interventions implemented as appropriate)    02/20/19 1727   Plan of Care Review   Progress improving   OTHER   Outcome Summary A+Ox4. Ambulating well w/ 1 assist and walker. Wearing brace when OOB. Pain under better control w/ Norco vs. Percocet. Plan to D/C Home tomorrow. [facility] working on getting 3-in-1 for patient.                  Jayna Klein CSW      Case Management   Progress Notes   Addendum   Date of Service:  2/19/2019  5:24 PM                        Discharge Planning Assessment  [facility]     Patient Name: Royer Arceo                  MRN: " [id number]  Today's Date: 2/19/2019                     Admit Date: 2/18/2019            Discharge Plan      Row Name 02/19/19 1724           Plan     Plan  Home with assistance from his wife and [facility]- referral made to [facility] to follow for HH orders.      Plan Comments  Met with the patient at bedside; explained role of CCP, verified facehseet and discussed discharge planning needs.  The patient plans to retun home with assistance from his wife, has a walker and has 6 steps with 1 handrail to enter his single story home in [place] through the garage. The patient's PCP is Maile Dickens, pharmacy is [facility] in [place] but he is agreeable to using [facility].  The patient denies any trouble remembering to take his medication or with affforidng his medication, denies any HH/SNF history, was provided with HH/SNF list and is agreeable to using [facility] because [facility] is not in network with [facility] Commercial insurance.  The patient was informed that P.T. is recommending HH services upon d/c.  Referral was made to [facility] to follow for [facility].  The patient denies any POA documents and states that his wife will transport him home upon d/c.  Follow to see if a 3 in 1 commode will be needed.   CCP will follow to assist with the patient's d/c home with [facility].  HO Ortiz

## 2019-02-21 NOTE — SIGNIFICANT NOTE
02/21/19 1306   Rehab Treatment   Discipline physical therapy assistant   Reason Treatment Not Performed (Pt d/c home today)

## 2019-02-25 ENCOUNTER — TELEPHONE (OUTPATIENT)
Dept: ORTHOPEDIC SURGERY | Facility: CLINIC | Age: 59
End: 2019-02-25

## 2019-02-25 NOTE — TELEPHONE ENCOUNTER
He can try a TENS unit, but he just had the surgery and if Tylenol is not enough that he probably still needs to take the Percocet

## 2019-03-04 ENCOUNTER — TELEPHONE (OUTPATIENT)
Dept: ORTHOPEDIC SURGERY | Facility: CLINIC | Age: 59
End: 2019-03-04

## 2019-03-04 NOTE — TELEPHONE ENCOUNTER
Patient says that over the weekend he has been having pain around the area where the hardware is in his back. Wants to know if this could be a muscle spasm, says that it hurts to walk even when using the walker.

## 2019-03-04 NOTE — TELEPHONE ENCOUNTER
"Call returned to the patient.  He has been doing some short distance walking in the house since surgery.  Yesterday he decided he needed to get out and went around the block for a long walk.  He is now complaining of what he describes as \"a catch\" in his back.  Is been taking Skelaxin which does seem to help.  Has no radicular symptoms.  Advised him that he should be walking twice a day every day and increasing his distance daily.  More than likely he has overdone it and is scheduled to see RODRIGUEZ MALCOLM tomorrow for follow-up  "

## 2019-03-05 ENCOUNTER — OFFICE VISIT (OUTPATIENT)
Dept: ORTHOPEDIC SURGERY | Facility: CLINIC | Age: 59
End: 2019-03-05

## 2019-03-05 ENCOUNTER — TELEPHONE (OUTPATIENT)
Dept: ORTHOPEDIC SURGERY | Facility: CLINIC | Age: 59
End: 2019-03-05

## 2019-03-05 VITALS — HEIGHT: 73 IN | BODY MASS INDEX: 35.12 KG/M2 | TEMPERATURE: 97.7 F | WEIGHT: 265 LBS

## 2019-03-05 DIAGNOSIS — Z98.1 S/P LUMBAR FUSION: Primary | ICD-10-CM

## 2019-03-05 PROCEDURE — 99024 POSTOP FOLLOW-UP VISIT: CPT | Performed by: ORTHOPAEDIC SURGERY

## 2019-03-05 PROCEDURE — 72100 X-RAY EXAM L-S SPINE 2/3 VWS: CPT | Performed by: ORTHOPAEDIC SURGERY

## 2019-03-05 RX ORDER — HYDROCODONE BITARTRATE AND ACETAMINOPHEN 7.5; 325 MG/1; MG/1
1-2 TABLET ORAL EVERY 4 HOURS PRN
Qty: 50 TABLET | Refills: 0 | Status: SHIPPED | OUTPATIENT
Start: 2019-03-05 | End: 2019-03-05 | Stop reason: SDUPTHER

## 2019-03-05 RX ORDER — HYDROCODONE BITARTRATE AND ACETAMINOPHEN 7.5; 325 MG/1; MG/1
1-2 TABLET ORAL EVERY 4 HOURS PRN
Qty: 50 TABLET | Refills: 0 | Status: SHIPPED | OUTPATIENT
Start: 2019-03-05 | End: 2019-04-16

## 2019-03-05 RX ORDER — METHYLPREDNISOLONE 4 MG/1
TABLET ORAL
Qty: 21 TABLET | Refills: 0 | Status: SHIPPED | OUTPATIENT
Start: 2019-03-05 | End: 2019-04-16

## 2019-03-05 RX ORDER — HYDROCODONE BITARTRATE AND ACETAMINOPHEN 7.5; 325 MG/1; MG/1
1-2 TABLET ORAL EVERY 4 HOURS PRN
Qty: 50 TABLET | Refills: 0 | Status: SHIPPED | OUTPATIENT
Start: 2019-03-05 | End: 2019-03-05

## 2019-03-05 NOTE — PROGRESS NOTES
Postoperatively the patient expresses normal incisional pain.  Some right leg pain.  Good strength on exam.  The wound is intact.  2 view x-rays of the lumbar spine obtained to evaluate hardware position and fusion bone show good position thereof and are unchanged from intraoperative images.  Instructions given.  We'll need lumbar x-rays on return visit.

## 2019-03-13 ENCOUNTER — TELEPHONE (OUTPATIENT)
Dept: ORTHOPEDIC SURGERY | Facility: CLINIC | Age: 59
End: 2019-03-13

## 2019-03-13 NOTE — TELEPHONE ENCOUNTER
Call returned to the patient.  Have advised him that he can wean himself for a walker and try cane however he does need to be careful that he does not lurch with the cane when he is walking.  For now would prefer that he avoid sleeping on his stomach for at least 3months postop.  He can continue laying on either side or on his back.  He is only taking Tylenol for pain now and is okay to resume his bourbon on occasion per RODRIGUEZ MALCOLM

## 2019-03-13 NOTE — TELEPHONE ENCOUNTER
YANNICK post op from 2/18/19.  He knows YNANICK is out and wants to know if other MD or AMB can reply to some questions:  1) He is a stomach sleeper and was told initially not to sleep on his stomach. Can he now?  2) Has been using a walker. Can he alternate using a cane also?  3) He is off of narcotic pain Rx and only takes Tylenol XS prn. He likes to have 1 bourbon on Friday nights to relax. Can he do that now?

## 2019-04-16 ENCOUNTER — OFFICE VISIT (OUTPATIENT)
Dept: ORTHOPEDIC SURGERY | Facility: CLINIC | Age: 59
End: 2019-04-16

## 2019-04-16 VITALS — TEMPERATURE: 98.3 F | WEIGHT: 265 LBS | BODY MASS INDEX: 35.12 KG/M2 | HEIGHT: 73 IN

## 2019-04-16 DIAGNOSIS — Z98.1 S/P LAMINECTOMY WITH SPINAL FUSION: Primary | ICD-10-CM

## 2019-04-16 PROCEDURE — 99024 POSTOP FOLLOW-UP VISIT: CPT | Performed by: ORTHOPAEDIC SURGERY

## 2019-04-16 PROCEDURE — 72100 X-RAY EXAM L-S SPINE 2/3 VWS: CPT | Performed by: ORTHOPAEDIC SURGERY

## 2019-04-16 RX ORDER — TADALAFIL 5 MG/1
TABLET ORAL
Refills: 3 | COMMUNITY
Start: 2019-03-28 | End: 2020-10-05

## 2019-05-15 ENCOUNTER — TELEPHONE (OUTPATIENT)
Dept: ORTHOPEDIC SURGERY | Facility: CLINIC | Age: 59
End: 2019-05-15

## 2019-05-15 NOTE — TELEPHONE ENCOUNTER
"Patient stated he has felt a \"popping\" in middle of back area for a week while walking. Denies pain. Physical therapist stated there's a \"grinding\" & \"popping\" sound when patient walks for therapist to see / listen.     Patient is scheduled 6/18/19 for F/U 2MOS BACK SX DATE 02/18   patient stated Physical therapist told patient to call & see if JGW wants to see patient sooner than FU on 6/18 to get new XR or keep on 6/18?   "

## 2019-05-16 NOTE — TELEPHONE ENCOUNTER
"Spoke to pt and scheduled next avail with YANNICK on 5/28. Pt reports he only feels the popping on his R side, not painful but \"sounds gross\"  "

## 2019-05-16 NOTE — TELEPHONE ENCOUNTER
Left message for patient to call back. If / when patient calls, ok to reschedule FU appt with YANNICK from 6/18/19 to next available date (currently 5/28).

## 2019-05-28 ENCOUNTER — OFFICE VISIT (OUTPATIENT)
Dept: ORTHOPEDIC SURGERY | Facility: CLINIC | Age: 59
End: 2019-05-28

## 2019-05-28 VITALS — TEMPERATURE: 97.9 F | WEIGHT: 265 LBS | BODY MASS INDEX: 35.12 KG/M2 | HEIGHT: 73 IN

## 2019-05-28 DIAGNOSIS — Z98.1 S/P LAMINECTOMY WITH SPINAL FUSION: Primary | ICD-10-CM

## 2019-05-28 PROCEDURE — 99213 OFFICE O/P EST LOW 20 MIN: CPT | Performed by: ORTHOPAEDIC SURGERY

## 2019-05-28 PROCEDURE — 72100 X-RAY EXAM L-S SPINE 2/3 VWS: CPT | Performed by: ORTHOPAEDIC SURGERY

## 2019-05-28 NOTE — PROGRESS NOTES
He is complaining of popping sound when he walks which he states was there for quite some time before surgery but disappeared for a while.  Overall he is doing much better since L4-5 fusion and in the course of undergoing therapy this popping is been noted he walks and is able to reproduce the sound which sounds like a rhythmic type of popping with his gait.  Figure-of-four test is negative and I can feel no motion through the sacroiliac joint when he walks but that seems to be the site on the right SI joint.  In any event good strength in the legs and x-rays suggest further consolidation of the fusion compared to prior films.  The sacroiliac joint looks fine in the AP view I have recommended follow-up visit in 2 months with repeat films and to call meantime for any worsening.

## 2019-08-20 ENCOUNTER — OFFICE VISIT (OUTPATIENT)
Dept: ORTHOPEDIC SURGERY | Facility: CLINIC | Age: 59
End: 2019-08-20

## 2019-08-20 VITALS — WEIGHT: 260 LBS | HEIGHT: 73 IN | TEMPERATURE: 98.7 F | BODY MASS INDEX: 34.46 KG/M2

## 2019-08-20 DIAGNOSIS — Z98.1 S/P LAMINECTOMY WITH SPINAL FUSION: Primary | ICD-10-CM

## 2019-08-20 PROCEDURE — 72100 X-RAY EXAM L-S SPINE 2/3 VWS: CPT | Performed by: ORTHOPAEDIC SURGERY

## 2019-08-20 PROCEDURE — 99213 OFFICE O/P EST LOW 20 MIN: CPT | Performed by: ORTHOPAEDIC SURGERY

## 2019-08-20 NOTE — PROGRESS NOTES
6 months out doing much better still some slight weakness in EHL on the right by report and by exam.  Overall he is very happy with his result.  Otherwise good strength in the legs but absent patellar reflexes.  Lumbar x-ray suggest a solid fusion compared to prior films.  I will see him back as needed instructions given

## 2021-03-19 ENCOUNTER — BULK ORDERING (OUTPATIENT)
Dept: CASE MANAGEMENT | Facility: OTHER | Age: 61
End: 2021-03-19

## 2021-03-19 DIAGNOSIS — Z23 IMMUNIZATION DUE: ICD-10-CM

## (undated) DEVICE — BITEBLOCK OMNI BLOC

## (undated) DEVICE — THE TORRENT IRRIGATION SCOPE CONNECTOR IS USED WITH THE TORRENT IRRIGATION TUBING TO PROVIDE IRRIGATION FLUIDS SUCH AS STERILE WATER DURING GASTROINTESTINAL ENDOSCOPIC PROCEDURES WHEN USED IN CONJUNCTION WITH AN IRRIGATION PUMP (OR ELECTROSURGICAL UNIT).: Brand: TORRENT

## (undated) DEVICE — Device: Brand: DEFENDO AIR/WATER/SUCTION AND BIOPSY VALVE

## (undated) DEVICE — GOWN,ECLIPSE,FABRIC-REINFORCED,X-LARGE: Brand: MEDLINE

## (undated) DEVICE — DRSNG PAD ABD 8X10IN STRL

## (undated) DEVICE — GLV SURG BIOGEL LTX PF 7

## (undated) DEVICE — SPNG GZ WOVN 4X4IN 12PLY 10/BX STRL

## (undated) DEVICE — DISPOSABLE BIPOLAR CABLE 12FT. (3.6M): Brand: KIRWAN

## (undated) DEVICE — MEDI-VAC YANKAUER SUCTION HANDLE W/BULBOUS TIP: Brand: CARDINAL HEALTH

## (undated) DEVICE — HANDPIECE SET WITH COAXIAL HIGH FLOW TIP AND SUCTION TUBE: Brand: INTERPULSE

## (undated) DEVICE — 6.0MM PRECISION ROUND

## (undated) DEVICE — PAD,ABDOMINAL,8"X10",ST,LF: Brand: MEDLINE

## (undated) DEVICE — ADHS SKIN DERMABOND TOP ADVANCED

## (undated) DEVICE — APPL DURAPREP IODOPHOR APL 26ML

## (undated) DEVICE — TUBING, SUCTION, 1/4" X 10', STRAIGHT: Brand: MEDLINE

## (undated) DEVICE — SPONGE,LAP,12"X12",XR,ST,5/PK,40PK/CS: Brand: MEDLINE

## (undated) DEVICE — 1010 S-DRAPE TOWEL DRAPE 10/BX: Brand: STERI-DRAPE™

## (undated) DEVICE — ANTIBACTERIAL UNDYED BRAIDED (POLYGLACTIN 910), SYNTHETIC ABSORBABLE SUTURE: Brand: COATED VICRYL

## (undated) DEVICE — CANN NASL CO2 TRULINK W/O2 A/

## (undated) DEVICE — BLOOD TRANSFUSION FILTER: Brand: HAEMONETICS

## (undated) DEVICE — PK ATS CUST W CARDIOTOMY RESEVOIR

## (undated) DEVICE — TOTAL TRAY, 16FR 10ML SIL FOLEY, URN: Brand: MEDLINE

## (undated) DEVICE — CODMAN® SURGICAL PATTIES 1/2" X 3" (1.27CM X 7.62CM): Brand: CODMAN®

## (undated) DEVICE — PK NEURO SPINE 40

## (undated) DEVICE — DRSNG GZ PETROLTM XEROFORM CURAD 1X8IN STRL

## (undated) DEVICE — GLV SURG BIOGEL LTX PF 7 1/2

## (undated) DEVICE — SUT VIC 1 OS8 27IN J699H

## (undated) DEVICE — TBG 02 CRUSH RESIST LF CLR 7FT

## (undated) DEVICE — OCCLUSIVE GAUZE STRIP OVERWRAP,3% BISMUTH TRIBROMOPHENATE IN PETROLATUM BLEND: Brand: XEROFORM

## (undated) DEVICE — SINGLE-USE BIOPSY FORCEPS: Brand: RADIAL JAW 4

## (undated) DEVICE — ENCORE® LATEX ORTHO SIZE 8, STERILE LATEX POWDER-FREE SURGICAL GLOVE: Brand: ENCORE

## (undated) DEVICE — SUT MNCRYL PLS ANTIB UD 4/0 PS2 18IN

## (undated) DEVICE — SOL NACL 0.9PCT 1000ML